# Patient Record
Sex: MALE | Race: ASIAN | NOT HISPANIC OR LATINO | Employment: UNEMPLOYED | URBAN - METROPOLITAN AREA
[De-identification: names, ages, dates, MRNs, and addresses within clinical notes are randomized per-mention and may not be internally consistent; named-entity substitution may affect disease eponyms.]

---

## 2017-02-08 ENCOUNTER — COMMUNICATION - HEALTHEAST (OUTPATIENT)
Dept: FAMILY MEDICINE | Facility: CLINIC | Age: 22
End: 2017-02-08

## 2017-02-08 DIAGNOSIS — K21.9 GERD (GASTROESOPHAGEAL REFLUX DISEASE): ICD-10-CM

## 2018-07-17 ENCOUNTER — AMBULATORY - HEALTHEAST (OUTPATIENT)
Dept: NURSING | Facility: CLINIC | Age: 23
End: 2018-07-17

## 2018-08-08 ENCOUNTER — COMMUNICATION - HEALTHEAST (OUTPATIENT)
Dept: FAMILY MEDICINE | Facility: CLINIC | Age: 23
End: 2018-08-08

## 2018-11-26 DIAGNOSIS — Z00.00 ROUTINE HISTORY AND PHYSICAL EXAMINATION OF ADULT: Primary | ICD-10-CM

## 2018-12-03 ENCOUNTER — OFFICE VISIT (OUTPATIENT)
Dept: FAMILY MEDICINE | Facility: CLINIC | Age: 23
End: 2018-12-03
Payer: MEDICAID

## 2018-12-03 VITALS
SYSTOLIC BLOOD PRESSURE: 124 MMHG | HEART RATE: 68 BPM | TEMPERATURE: 98 F | HEIGHT: 65 IN | BODY MASS INDEX: 22.76 KG/M2 | RESPIRATION RATE: 20 BRPM | WEIGHT: 136.6 LBS | DIASTOLIC BLOOD PRESSURE: 80 MMHG | OXYGEN SATURATION: 96 %

## 2018-12-03 DIAGNOSIS — Z00.00 ROUTINE GENERAL MEDICAL EXAMINATION AT A HEALTH CARE FACILITY: Primary | ICD-10-CM

## 2018-12-03 DIAGNOSIS — K21.9 GASTROESOPHAGEAL REFLUX DISEASE WITHOUT ESOPHAGITIS: ICD-10-CM

## 2018-12-03 RX ORDER — MAGNESIUM HYDROXIDE/ALUMINUM HYDROXICE/SIMETHICONE 120; 1200; 1200 MG/30ML; MG/30ML; MG/30ML
15 SUSPENSION ORAL EVERY 6 HOURS PRN
Qty: 150 ML | Refills: 0 | Status: SHIPPED | OUTPATIENT
Start: 2018-12-03 | End: 2019-12-05

## 2018-12-03 RX ORDER — MAGNESIUM HYDROXIDE/ALUMINUM HYDROXICE/SIMETHICONE 120; 1200; 1200 MG/30ML; MG/30ML; MG/30ML
15 SUSPENSION ORAL
COMMUNITY
Start: 2015-05-07 | End: 2018-12-03

## 2018-12-03 NOTE — MR AVS SNAPSHOT
After Visit Summary   12/3/2018    Briana Rizzo    MRN: [id number]           Patient Information     Date Of Birth          1995        Visit Information        Provider Department      12/3/2018 1:30 PM Richard Hernandez MD [facility]        Today's Diagnoses     Routine general medical examination at a health care facility    -  1    Gastroesophageal reflux disease without esophagitis          Care Instructions    -Please follow-up in 2 weeks when we can get your records.    Preventive Health Recommendations  Male Ages 21 - 25     Yearly exam:             See your health care provider every year in order to  o   Review health changes.   o   Discuss preventive care.    o   Review your medicines if your doctor has prescribed any.    You should be tested each year for STDs (sexually transmitted diseases).     Talk to your provider about cholesterol testing.      If you are at risk for diabetes, you should have a diabetes test (fasting glucose).    Shots: Get a flu shot each year. Get a tetanus shot every 10 years.     Nutrition:    Eat at least 5 servings of fruits and vegetables daily.     Eat whole-grain bread, whole-wheat pasta and brown rice instead of white grains and rice.     Get adequate calcium and Vitamin D.     Lifestyle    Exercise for at least 150 minutes a week (30 minutes a day, 5 days a week). This will help you control your weight and prevent disease.     Limit alcohol to one drink per day.     No smoking.     Wear sunscreen to prevent skin cancer.     See your dentist every six months for an exam and cleaning.             Follow-ups after your visit        Who to contact     Please call your clinic at [phone] to:    Ask questions about your health    Make or cancel appointments    Discuss your medicines    Learn about your test results    Speak to your doctor            Additional Information About Your Visit        Care EveryWhere ID     This is your Care EveryWhere ID.  "This could be used by other organizations to access your [facility] medical records  [id number]        Your Vitals Were     Pulse Temperature Respirations Height Pulse Oximetry BMI (Body Mass Index)    68 98  F (36.7  C) (Oral) 20 5' 4.6\" (164.1 cm) 96% 23.01 kg/m2       Blood Pressure from Last 3 Encounters:   12/03/18 124/80    Weight from Last 3 Encounters:   12/03/18 136 lb 9.6 oz (62 kg)              Today, you had the following     No orders found for display         Today's Medication Changes          These changes are accurate as of 12/3/18  1:57 PM.  If you have any questions, ask your nurse or doctor.               These medicines have changed or have updated prescriptions.        Dose/Directions    alum & mag hydroxide-simethicone 200-200-20 MG/5ML Susp suspension   Commonly known as:  SB ANTACID/ANTIGAS   This may have changed:    - when to take this  - reasons to take this   Used for:  Gastroesophageal reflux disease without esophagitis   Changed by:  Richard Hernandez MD        Dose:  15 mL   Take 15 mLs by mouth every 6 hours as needed for indigestion   Quantity:  150 mL   Refills:  0            Where to get your medicines      These medications were sent to [address] [address]     Phone:  [phone]     alum & mag hydroxide-simethicone 200-200-20 MG/5ML Susp suspension                Primary Care Provider Office Phone # Fax #    Sanaz Rose [phone]       [address] [place] [address]        Equal Access to Services     LONNIE WHITFIELD : Hadii caroline grider hadasho Soomaali, waaxda luqadaha, qaybta kaalmada monalisayaguerline gonzalez idirenata nuñez. So [facility] [phone].    ATENCIÓN: Si habla español, tiene a velázquez disposición servicios gratuitos de asistencia lingüística. Llame al [phone].    We comply with applicable federal civil rights laws and [place] laws. We do not discriminate on " the basis of race, color, national origin, age, disability, sex, sexual orientation, or gender identity.            Thank you!     Thank you for choosing [facility]  for your care. Our goal is always to provide you with excellent care. Hearing back from our patients is one way we can continue to improve our services. Please take a few minutes to complete the written survey that you may receive in the mail after your visit with us. Thank you!             Your Updated Medication List - Protect others around you: Learn how to safely use, store and throw away your medicines at www.disposemymeds.org.          This list is accurate as of 12/3/18  1:57 PM.  Always use your most recent med list.                   Brand Name Dispense Instructions for use Diagnosis    alum & mag hydroxide-simethicone 200-200-20 MG/5ML Susp suspension    SB ANTACID/ANTIGAS    150 mL    Take 15 mLs by mouth every 6 hours as needed for indigestion    Gastroesophageal reflux disease without esophagitis

## 2018-12-03 NOTE — PROGRESS NOTES
Preceptor Attestation:   Patient seen, evaluated and discussed with the resident. I have verified the content of the note, which accurately reflects my assessment of the patient and the plan of care.   Supervising Physician:  Ronak Mena MD

## 2018-12-03 NOTE — NURSING NOTE
Due to patient being non-English speaking/uses sign language, an  was used for this visit. Only for face-to-face interpretation by an external agency, date and length of interpretation can be found on the scanned worksheet.     name: Luca Segovia  Agency: [employer]  Language: [place]   Telephone number: [phone]  Type of interpretation: Face-to-face, spoken

## 2018-12-03 NOTE — PATIENT INSTRUCTIONS
-Please follow-up in 2 weeks when we can get your records.    Preventive Health Recommendations  Male Ages 21 - 25     Yearly exam:             See your health care provider every year in order to  o   Review health changes.   o   Discuss preventive care.    o   Review your medicines if your doctor has prescribed any.    You should be tested each year for STDs (sexually transmitted diseases).     Talk to your provider about cholesterol testing.      If you are at risk for diabetes, you should have a diabetes test (fasting glucose).    Shots: Get a flu shot each year. Get a tetanus shot every 10 years.     Nutrition:    Eat at least 5 servings of fruits and vegetables daily.     Eat whole-grain bread, whole-wheat pasta and brown rice instead of white grains and rice.     Get adequate calcium and Vitamin D.     Lifestyle    Exercise for at least 150 minutes a week (30 minutes a day, 5 days a week). This will help you control your weight and prevent disease.     Limit alcohol to one drink per day.     No smoking.     Wear sunscreen to prevent skin cancer.     See your dentist every six months for an exam and cleaning.

## 2018-12-03 NOTE — PROGRESS NOTES
Male Physical Note      Concerns today: Needs refills for medications. Has emesis 2-3 times per week for the last two years.     A [employer]  was used for  this visit.     ROS:                      CONSTITUTIONAL: no fatigue, no unexpected change in weight  SKIN: no worrisome rashes, no worrisome moles, no worrisome lesions  EYES: no acute vision problems or changes  ENT: no ear problems, no mouth problems, no throat problems  RESP: no significant cough, no shortness of breath  CV: no chest pain, no palpitations, no new or worsening peripheral edema  GI: no nausea, no vomiting, no constipation, no diarrhea    No past medical history on file.     No family history on file.  Reviewed no other significant FH           Family History and past Medical History reviewed and unchanged/updated.    Social History   Substance Use Topics     Smoking status: Not on file     Smokeless tobacco: Not on file     Alcohol use Not on file     Single  Children ? no    Has anyone hurt you physically, for example by pushing, hitting, slapping or kicking you or forcing you to have sex? Denies  Do you feel threatened or controlled by a partner, ex-partner or anyone in your life? Denies    RISK BEHAVIORS AND HEALTHY HABITS:  Tobacco Use/Smoking: None  Illicit Drug Use: None  ETOH: None  Do you use alcohol? No  Sexually Active: No  Diet (5-7 servings of fruits/veg daily): Yes   Exercise (30 min accumulated most days):No  Dental Care: Yes   Calcium 1500 mg/d:  No  Seat Belt Use: Yes     HIV screening:  Testing not indicated       Immunization History   Administered Date(s) Administered     Hep B, Peds or Adolescent 12/06/2006     HepA, Unspecified 05/15/2008, 12/15/2008     Influenza Vaccine, 3 YRS +, IM (QUADRIVALENT W/PRESERVATIVES) 10/20/2014     MMR 12/06/2006, 02/26/2008     Meningococcal (Menactra ) 05/15/2008     Meningococcal (Menomune ) 02/26/2008     Polio, Unspecified  02/26/2008, 05/15/2008, 12/15/2008     Td (Adult),  "Adsorbed 12/06/2006, 12/15/2008     Tdap (Adult) Unspecified Formulation 02/26/2008     Varicella 12/06/2006, 02/26/2008     Reviewed Immunization Record Today    EXAMINATION:  /80  Pulse 68  Temp 98  F (36.7  C) (Oral)  Resp 20  Ht 5' 4.6\" (164.1 cm)  Wt 136 lb 9.6 oz (62 kg)  SpO2 96%  BMI 23.01 kg/m2  GENERAL: healthy, alert and no distress  EYES: Eyes grossly normal to inspection, extraocular movements - intact, and PERRL  HENT: ear canals- normal; TMs- clear effusion on left; Nose- normal; Mouth- no ulcers, no lesions  NECK: no tenderness, no adenopathy, no asymmetry, no masses, no stiffness; thyroid- normal to palpation  RESP: lungs clear to auscultation - no rales, no rhonchi, no wheezes  CV: regular rates and rhythm, normal S1 S2, no S3 or S4 and no murmur, no click or rub -  ABDOMEN: soft, no tenderness, no  hepatosplenomegaly, no masses, normal bowel sounds  MS: extremities- no gross deformities noted, no edema  SKIN: no suspicious lesions, no rashes  NEURO: strength and tone- normal, sensory exam- grossly normal, mentation- intact, speech- normal, reflexes- symmetric  BACK: no CVA tenderness, no paralumbar tenderness  - male: Patient declined  exam  PSYCH: Does not articulate thoughts, does not speak in sentences, Per  patient is speaking nonsensically at times.   LYMPHATICS: ant. cervical- normal, post. cervical- normal    ASSESSMENT:  1. Health Care Maintenance: Normal Physical Exam  2. Per patient's mother, patient has some type of learning disability.   3. Reflux    PLAN:  1. [facility]  2. Refill patient's Maalox  3. Follow-up in 2 weeks.     "

## 2018-12-17 ENCOUNTER — OFFICE VISIT (OUTPATIENT)
Dept: FAMILY MEDICINE | Facility: CLINIC | Age: 23
End: 2018-12-17
Payer: MEDICAID

## 2018-12-17 VITALS
OXYGEN SATURATION: 98 % | RESPIRATION RATE: 20 BRPM | SYSTOLIC BLOOD PRESSURE: 146 MMHG | HEART RATE: 97 BPM | DIASTOLIC BLOOD PRESSURE: 80 MMHG | TEMPERATURE: 98.5 F | WEIGHT: 136 LBS | BODY MASS INDEX: 22.91 KG/M2

## 2018-12-17 DIAGNOSIS — R04.0 EPISTAXIS: Primary | ICD-10-CM

## 2018-12-17 NOTE — NURSING NOTE
Due to patient being non-English speaking/uses sign language, an  was used for this visit. Only for face-to-face interpretation by an external agency, date and length of interpretation can be found on the scanned worksheet.     name: MARIAM BECERRA  Agency: [employer]  Language: [place]   Telephone number: [phone]  Type of interpretation: Face-to-face, spoken

## 2018-12-17 NOTE — PROGRESS NOTES
ASSESSMENT AND PLAN      Briana was seen today for recheck.    Diagnoses and all orders for this visit:    Epistaxis: Likely secondary to dry weather. No other signs of coagulation problems. Recommended that patient use Vaseline on the inside of the nares. Follow-up in the spring  -Requested records again from [facility] as well as [facility]. Will have social work follow-up on patient's care management status.    Richard Hernandez MD PGY3  [facility]                SUBJECTIVE       Briana Rizzo is a 23 year old  male with a PMH significant for:   There is no problem list on file for this patient.    He presents to establish care. Patient's mother also has concern that patient has nose bleeds 2-3 times per week that last ~10 mins at a time. He does not have any bruising or bleeding from his gums. They do not hold pressure with nose bleeds, but put a cold pack on his forehead. Bleeding does stop spontaneously.    Per mother, patient has had difficulty with speech since he was a child. Per mother, he had malaria soon after he was born. Moved to [place] in 2007 from refugee [place]. He can eat and dress himself. He needs some help with taking a shower. He is unable to cook for himself. Mother is his PCA. He is getting ssi benefits at this time.     He sleeps at 11 pm, wakes up at 7-8am typically. 2-3 times per week he is up througout the evening and doesn't sleeping.     Per mother, he has a care manager, but she does not know who it is. He did attend [facility] and had 2-3 years of classes afterwards. He does not currently have any speech therapy.     PMH, Medications and Allergies were reviewed and updated as needed.          OBJECTIVE     Vitals:    12/17/18 0926   BP: 146/80   Pulse: 97   Resp: 20   Temp: 98.5  F (36.9  C)   TempSrc: Oral   SpO2: 98%   Weight: 61.7 kg (136 lb)     Body mass index is 22.91 kg/m .    General: Well appearing, smiles at me when asked questions, will follow commands, does  not answer questions.  HEENT: Septum in bilateral nares are erythematous, no blood in the nares. Pharynx clear, mucous membranes moist.   CV: S1, S2, RRR. No murmurs, clicks, or rubs  RESP: Clear to auscultation bilaterally.     No results found for this or any previous visit (from the past 24 hour(s)).

## 2018-12-18 NOTE — PROGRESS NOTES
Preceptor Attestation:   Patient seen, evaluated and discussed with the resident. I have verified the content of the note, which accurately reflects my assessment of the patient and the plan of care.   Supervising Physician:  Angy Coello MD

## 2018-12-19 ENCOUNTER — TELEPHONE (OUTPATIENT)
Dept: FAMILY MEDICINE | Facility: CLINIC | Age: 23
End: 2018-12-19

## 2018-12-19 NOTE — TELEPHONE ENCOUNTER
BALAJI recieved a message from  requesting care coordination help in finding who the patient's  is. Per visit note from 12/17/18, mother reports that Briana has a  but she is not sure who that person is. Gloria is requesting records from [facility] and [facility]. SW will review patient's chart in 2 weeks to determine if those records have been requested and review for any relevant information that may lead to finding out who the current  is.     If records have not been received in 2 weeks, SW will reach out to mother to inquire further on any information she might have leading to /agency.     BUCK Anand

## 2019-01-02 NOTE — TELEPHONE ENCOUNTER
Records have not been recieved from either of the agencies listed below.     SW will check again in 1 week. If no records at that time, SW will attempt outreach to request the records again.     BUCK Anand

## 2019-01-11 PROBLEM — F09 COGNITIVE DISORDER: Status: ACTIVE | Noted: 2019-01-11

## 2019-11-26 DIAGNOSIS — K21.9 GASTROESOPHAGEAL REFLUX DISEASE WITHOUT ESOPHAGITIS: ICD-10-CM

## 2019-12-05 ENCOUNTER — OFFICE VISIT (OUTPATIENT)
Dept: FAMILY MEDICINE | Facility: CLINIC | Age: 24
End: 2019-12-05
Payer: MEDICAID

## 2019-12-05 VITALS
TEMPERATURE: 97.9 F | RESPIRATION RATE: 18 BRPM | HEIGHT: 65 IN | BODY MASS INDEX: 22.86 KG/M2 | HEART RATE: 87 BPM | WEIGHT: 137.2 LBS | SYSTOLIC BLOOD PRESSURE: 120 MMHG | DIASTOLIC BLOOD PRESSURE: 79 MMHG

## 2019-12-05 DIAGNOSIS — R04.0 EPISTAXIS: ICD-10-CM

## 2019-12-05 DIAGNOSIS — Z23 NEED FOR PROPHYLACTIC VACCINATION AND INOCULATION AGAINST INFLUENZA: ICD-10-CM

## 2019-12-05 DIAGNOSIS — R10.84 ABDOMINAL PAIN, GENERALIZED: Primary | ICD-10-CM

## 2019-12-05 DIAGNOSIS — A04.8 H. PYLORI INFECTION: ICD-10-CM

## 2019-12-05 DIAGNOSIS — M79.18 MUSCULOSKELETAL PAIN: ICD-10-CM

## 2019-12-05 DIAGNOSIS — K21.9 GASTROESOPHAGEAL REFLUX DISEASE WITHOUT ESOPHAGITIS: ICD-10-CM

## 2019-12-05 LAB
ALBUMIN SERPL-MCNC: 5 MG/DL (ref 3.9–5.1)
ALP SERPL-CCNC: 69.6 U/L (ref 40–150)
ALT SERPL-CCNC: 27 U/L (ref 0–45)
AST SERPL-CCNC: 25.5 U/L (ref 0–55)
BILIRUB SERPL-MCNC: 1.6 MG/DL (ref 0.2–1.3)
BUN SERPL-MCNC: 11.3 MG/DL (ref 7–21)
CALCIUM SERPL-MCNC: 10.2 MG/DL (ref 8.5–10.1)
CHLORIDE SERPLBLD-SCNC: 99.4 MMOL/L (ref 98–110)
CO2 SERPL-SCNC: 26.3 MMOL/L (ref 20–32)
CREAT SERPL-MCNC: 1 MG/DL (ref 0.7–1.3)
GFR SERPL CREATININE-BSD FRML MDRD: >90 ML/MIN/1.7 M2
GLUCOSE SERPL-MCNC: 100.2 MG'DL (ref 70–99)
POTASSIUM SERPL-SCNC: 4 MMOL/DL (ref 3.2–4.6)
PROT SERPL-MCNC: 8.4 G/DL (ref 6.8–8.8)
SODIUM SERPL-SCNC: 136 MMOL/L (ref 132–142)

## 2019-12-05 RX ORDER — BACITRACIN ZINC 500 [USP'U]/G
OINTMENT TOPICAL 2 TIMES DAILY
Qty: 30 G | Refills: 3 | Status: SHIPPED | OUTPATIENT
Start: 2019-12-05 | End: 2021-06-22

## 2019-12-05 RX ORDER — MAGNESIUM HYDROXIDE/ALUMINUM HYDROXICE/SIMETHICONE 120; 1200; 1200 MG/30ML; MG/30ML; MG/30ML
15 SUSPENSION ORAL EVERY 6 HOURS PRN
Qty: 150 ML | Refills: 0 | Status: SHIPPED | OUTPATIENT
Start: 2019-12-05 | End: 2024-05-28

## 2019-12-05 RX ORDER — ACETAMINOPHEN 500 MG
500-1000 TABLET ORAL EVERY 6 HOURS PRN
Qty: 1 BOTTLE | Refills: 3 | Status: SHIPPED | OUTPATIENT
Start: 2019-12-05 | End: 2022-01-24

## 2019-12-05 ASSESSMENT — MIFFLIN-ST. JEOR: SCORE: 1534.22

## 2019-12-05 NOTE — PROGRESS NOTES
"       SUBJECTIVE       Briana Rizzo is a 24 year old  male with a PMH significant for:     Patient Active Problem List   Diagnosis     Cognitive disorder     Patient presents today with multiple complaints of nose bleeds, headache, abdominal pain, hip pain, calf pain, and heel pain. 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  He was a refugee who immigrated to [place] in 2007.  He has severely limited speech resulting in limited ability to obtain history.     He has had long-standing history of nosebleeds which he lately has been getting twice a day which last 5 to 10 minutes and resolve spontaneously per pt's dad.  Patient does pick his nose.  Per chart review patient had similar complaint in 2018 and was instructed to use Vaseline ointment in his nares.  Unclear whether this was helpful or not.      Patient's abdominal pain has been going on for the past 2 months.  Per patient's father is getting worse and he has begun to throw up.  Patient answers yes when asked if he ever has blood in his vomit or stool.  Her patient's father emesis is composed of yellow stomach contents.  Patient unable to expound on symptoms, but per father he has been asking for medication at home.    The patient's hip pain, calf pain, and heel pain have similarly been present for about the past 2 months.  Patient is unable to qualify his pain further but similarly asks for medication and reports pain at home.    Patient has had subjective fever per father \"1-2 times per week\".  He has had no diarrhea or constipation, no sick contacts, and no change in his appetite.  However due to stomach pain he often eats in small quantities.  Patient has no chest pain or difficulty with breathing and no change in weight.      PMH, Medications and Allergies were reviewed and updated as needed.        REVIEW OF SYSTEMS     12 point review of systems negative except per HPI        OBJECTIVE     Vitals:    " "12/05/19 1108   BP: 120/79   Pulse: 87   Resp: 18   Temp: 97.9  F (36.6  C)   Weight: 62.2 kg (137 lb 3.2 oz)   Height: 1.651 m (5' 5\")     Body mass index is 22.83 kg/m .    Constitutional: Awake, alert, cooperative, no apparent distress, and appears stated age.  Eyes: Lids and lashes normal, pupils equal, round and reactive to light  ENT: Normocephalic, without obvious abnormality, atramatic, external ears without lesions, oral pharynx with moist mucus membranes, tonsils without erythema or exudates, gums normal and good dentition.  Nares with no clots or lacerations  Lungs: No increased work of breathing, good air exchange, clear to auscultation bilaterally, no crackles or wheezing.  Cardiovascular: Regular rate and rhythm, normal S1 and S2, no S3 or S4, and no murmur noted.  Abdomen: No scars, normal bowel sounds, soft, non-distended, tenderness to palpation with no rebound or guarding  Musculoskeletal: No redness, warmth, or swelling of the joints.  Full range of motion noted.  Motor strength is 5 out of 5 all extremities bilaterally.  Tone is normal.  Neurologic: Awake, alert, oriented to name, place and time.  Cranial nerves II-XII are grossly intact.  Motor is 5 out of 5 bilaterally.    Neuropsychiatric: Normal affect, mood, difficult to assess due to cognitive limitations  Skin: No rashes, erythema, pallor, petechia or purpura.    No results found for this or any previous visit (from the past 24 hour(s)).        ASSESSMENT AND PLAN     1. Abdominal pain, generalized  Unclear etiology, however patient requesting \"liquid medication\" prescribed in the past.  Per chart review was prescribed aluminum mag hydroxide simethicone suspension, will refill.  Due to reports of possible blood in vomit and stool will investigate with basic labs.  Considering high rate of H. pylori in [place] refugee population will test for fecal antigen as well  - alum & mag hydroxide-simethicone (SB ANTACID/ANTIGAS) 200-200-20 MG/5ML SUSP " suspension; Take 15 mLs by mouth every 6 hours as needed for indigestion  Dispense: 150 mL; Refill: 0  - Comprehensive Metabolic Panel ([facility])  - H. Pylori Agn Fecal ([facility]); Future    2. Musculoskeletal pain  Unclear symptomatology and etiology, difficult to assess due to limited ability to report history.  Treat symptoms with Tylenol and refer to physical therapy for evaluation and treatment of patient's ambulation, strength and flexibility.  - PHYSICAL THERAPY REFERRAL; Future  - acetaminophen (TYLENOL) 500 MG tablet; Take 1-2 tablets (500-1,000 mg) by mouth every 6 hours as needed for mild pain  Dispense: 1 Bottle; Refill: 3    3. Epistaxis  Given history patient's nosebleeds are likely secondary to dry air and digital trauma.  Will treat with bacitracin ointment due to risk of infection from ongoing digital trauma.  - bacitracin 500 UNIT/GM external ointment; Apply topically 2 times daily Apply to nose twice daily  Dispense: 30 g; Refill: 3    Addendum:  Pt tested positive for H. Pylori via fecal antigen testing. With no history of penicillin allergy or resistant organism will proceed with treatment of H. Pylori with Clarithromycin based triple therapy. (Omeprazole 20mg twice daily, Clarithromycin 500mg twice daily, and Amoxicillin 1g twice daily) for 14 days. Prescriptions sent to pharmacy, and result routed with instructions to call pt. Pt may set up appointment to discuss these results and treatment if desires, otherwise please set up appointment in 2 weeks to evaluate success of treatment.     RTC in 2 weeks for follow up of abdominal pain and epistaxis or sooner if develops new or worsening symptoms.    Jus To MD

## 2019-12-05 NOTE — PROGRESS NOTES
Preceptor Attestation:   Patient seen, evaluated and discussed with the resident. I have verified the content of the note, which accurately reflects my assessment of the patient and the plan of care.   Supervising Physician:  Ilan Strong MD.

## 2019-12-05 NOTE — NURSING NOTE
Due to patient being non-English speaking/uses sign language, an  was used for this visit. Only for face-to-face interpretation by an external agency, date and length of interpretation can be found on the scanned worksheet.     name: Luca Segovia  Agency: [employer]  Language: [place]   Telephone number: [phone]  Type of interpretation: Face-to-face, spoken

## 2019-12-06 ENCOUNTER — AMBULATORY - HEALTHEAST (OUTPATIENT)
Dept: OCCUPATIONAL THERAPY | Facility: CLINIC | Age: 24
End: 2019-12-06

## 2019-12-06 DIAGNOSIS — M79.18 MUSCULOSKELETAL PAIN: ICD-10-CM

## 2019-12-06 DIAGNOSIS — R10.84 ABDOMINAL PAIN, GENERALIZED: ICD-10-CM

## 2019-12-09 LAB — H PYLORI ANTIGEN: POSITIVE

## 2019-12-10 RX ORDER — OMEPRAZOLE 20 MG/1
20 TABLET, DELAYED RELEASE ORAL 2 TIMES DAILY
Qty: 28 TABLET | Refills: 0 | Status: SHIPPED | OUTPATIENT
Start: 2019-12-10 | End: 2019-12-24

## 2019-12-10 RX ORDER — CLARITHROMYCIN 500 MG
500 TABLET ORAL 2 TIMES DAILY
Qty: 28 TABLET | Refills: 0 | Status: SHIPPED | OUTPATIENT
Start: 2019-12-10 | End: 2019-12-24

## 2019-12-10 RX ORDER — AMOXICILLIN 500 MG/1
1000 CAPSULE ORAL 2 TIMES DAILY
Qty: 56 CAPSULE | Refills: 0 | Status: SHIPPED | OUTPATIENT
Start: 2019-12-10 | End: 2019-12-24

## 2020-02-24 ENCOUNTER — OFFICE VISIT (OUTPATIENT)
Dept: FAMILY MEDICINE | Facility: CLINIC | Age: 25
End: 2020-02-24
Payer: MEDICAID

## 2020-02-24 VITALS
TEMPERATURE: 98.2 F | BODY MASS INDEX: 22.82 KG/M2 | OXYGEN SATURATION: 98 % | HEIGHT: 65 IN | RESPIRATION RATE: 16 BRPM | HEART RATE: 72 BPM | SYSTOLIC BLOOD PRESSURE: 126 MMHG | DIASTOLIC BLOOD PRESSURE: 80 MMHG | WEIGHT: 137 LBS

## 2020-02-24 DIAGNOSIS — F09 COGNITIVE DISORDER: Primary | ICD-10-CM

## 2020-02-24 ASSESSMENT — MIFFLIN-ST. JEOR: SCORE: 1521.43

## 2020-02-24 NOTE — LETTER
2/24/2020    Re: Briana Rizzo  1995    To Whom It May Concern:    Briana Rizzo is my patient at [facility] in [facility]. I understand he recently had his PCA hours cut from 5 1/2 hours daily to 1/2 hour daily. This was apparently due to a miscommunication from his father in the setting of an acute illness. It is my medical opinion that Briana Rizzo requires medical assistance beyond that of 1/2 hour daily due to his diagnosis of Cognitive delay. Briana is largely non-verbal and requires assistance with ADLs and medication administration.     Please reassess PCA hours as soon as possible.     Do not hesitate to call with any questions or concerns.    Jus To MD  2/24/2020 11:17 AM

## 2020-02-24 NOTE — PROGRESS NOTES
"       SUBJECTIVE       Briana Rizzo is a 25 year old  male with a PMH significant for:     Patient Active Problem List   Diagnosis     Cognitive disorder     He presents for help with PCA hours. Previously Briana had PCA assistance for 5hrs and 30 min daily, but recently when he was interviewed for renewal of his services he had a miscommunication because he was sick. He says when he was asked questions about Briana's requirements he answered \"no\" to everything without listening closely because he was vomiting and acutely ill. He says that this interview resulted in a significant reduction in PCA assistance for Briana. He is here today requesting a re-evaluation of Briana's PCA hours.     Briana requires assistance for cognitive delay. He is largely non-verbal and needs help with ADLs and medication administration.     PMH, Medications and Allergies were reviewed and updated as needed.        REVIEW OF SYSTEMS     12 point review of systems negative except as noted in HPI.        OBJECTIVE     Vitals:    02/24/20 1046   BP: 126/80   BP Location: Left arm   Patient Position: Sitting   Pulse: 72   Resp: 16   Temp: 98.2  F (36.8  C)   TempSrc: Oral   SpO2: 98%   Weight: 62.1 kg (137 lb)   Height: 1.64 m (5' 4.57\")     Body mass index is 23.1 kg/m .    Constitutional: Awake, alert, cooperative, no apparent distress, and appears stated age.  Eyes: Lids and lashes normal, pupils equal, round and reactive to light, extra ocular muscles intact, sclera clear, conjunctiva normal.  ENT: Normocephalic, without obvious abnormality, atraumatic, oral pharynx with moist mucus membranes  Lungs: No increased work of breathing, good air exchange, clear to auscultation bilaterally, no crackles or wheezing.  Cardiovascular: Regular rate and rhythm, normal S1 and S2, no S3 or S4, and no murmur noted.  Neurologic: Awake, alert, Cranial nerves II-XII are grossly intact.    Neuropsychiatric: Baseline affect/mood.   Skin: No rashes, erythema, pallor, " petechia or purpura.    No results found for this or any previous visit (from the past 24 hour(s)).        ASSESSMENT AND PLAN     Briana was seen today for other.    Diagnoses and all orders for this visit:    Cognitive disorder      Briana's father recently completed an interview for assessment of PCA hours which resulted in a significant cutback of his hours. He is here requesting a physician letter for reassessment of his hours. A letter was written and provided to the pt stating it is my medical opinion he requires more services and that a reassessment should be done asap.       RTC as needed if develops new or worsening symptoms.    Jus To MD  I precepted with Dr. Roby Rosario

## 2020-02-24 NOTE — NURSING NOTE
Due to patient being non-English speaking/uses sign language, an  was used for this visit. Only for face-to-face interpretation by an external agency, date and length of interpretation can be found on the scanned worksheet.     name: Luca Segovia  Agency: [employer]  Language: Claudia   Telephone number: [phone]  Type of interpretation: Face-to-face, spoken

## 2020-02-24 NOTE — PROGRESS NOTES
Preceptor Attestation:   Patient seen, evaluated and discussed with the resident. I have verified the content of the note, which accurately reflects my assessment of the patient and the plan of care.   Supervising Physician:  Roby Frazier MD

## 2020-02-24 NOTE — LETTER
2/24/2020    Re: Briana Rizzo  1995      To Whom It May Concern:    Briana Rizzo is my patient at [facility]. I understand he recently had his PCA hours cut from 5 1/2 hours daily to 1/2 hour daily. This was apparently due to a miscommunication from his father in the setting of an acute illness. It is my medical opinion that Briana Rizzo requires medical assistance beyond that of 1/2 hour daily due to his diagnosis of Cognitive delay. Briana is largely non-verbal and requires assistance with ADLs and medication administration.     Please do not hesitate to call with any questions or concerns.    Jus To MD  2/24/2020 11:05 AM

## 2020-08-31 DIAGNOSIS — K21.9 GASTROESOPHAGEAL REFLUX DISEASE WITHOUT ESOPHAGITIS: ICD-10-CM

## 2020-09-03 RX ORDER — ALUMINUM HYDROXIDE, MAGNESIUM HYDROXIDE, SIMETHICONE 400; 400; 40 MG/5ML; MG/5ML; MG/5ML
SUSPENSION ORAL
Qty: 355 ML | Refills: 0 | Status: SHIPPED | OUTPATIENT
Start: 2020-09-03 | End: 2020-09-19

## 2020-09-14 DIAGNOSIS — K21.9 GASTROESOPHAGEAL REFLUX DISEASE WITHOUT ESOPHAGITIS: ICD-10-CM

## 2020-09-19 RX ORDER — ALUMINUM HYDROXIDE, MAGNESIUM HYDROXIDE, SIMETHICONE 400; 400; 40 MG/5ML; MG/5ML; MG/5ML
SUSPENSION ORAL
Qty: 355 ML | Refills: 0 | Status: SHIPPED | OUTPATIENT
Start: 2020-09-19 | End: 2021-06-22

## 2021-05-14 NOTE — TELEPHONE ENCOUNTER
Patient scheduled for physical and med refill.      Emmanuel Newberry, MATTHEW  1:44 PM  5/14/2021

## 2021-05-28 ENCOUNTER — OFFICE VISIT (OUTPATIENT)
Dept: FAMILY MEDICINE | Facility: CLINIC | Age: 26
End: 2021-05-28
Payer: MEDICAID

## 2021-05-28 ENCOUNTER — RECORDS - HEALTHEAST (OUTPATIENT)
Dept: ADMINISTRATIVE | Facility: OTHER | Age: 26
End: 2021-05-28

## 2021-05-28 ENCOUNTER — RECORDS - HEALTHEAST (OUTPATIENT)
Dept: ADMINISTRATIVE | Facility: REHABILITATION | Age: 26
End: 2021-05-28

## 2021-05-28 VITALS
HEART RATE: 66 BPM | DIASTOLIC BLOOD PRESSURE: 86 MMHG | BODY MASS INDEX: 25.43 KG/M2 | SYSTOLIC BLOOD PRESSURE: 128 MMHG | HEIGHT: 65 IN | RESPIRATION RATE: 16 BRPM | WEIGHT: 152.6 LBS

## 2021-05-28 DIAGNOSIS — A04.8 H. PYLORI INFECTION: ICD-10-CM

## 2021-05-28 DIAGNOSIS — R52 WHOLE BODY PAIN: ICD-10-CM

## 2021-05-28 DIAGNOSIS — M25.50 ARTHRALGIA, UNSPECIFIED JOINT: ICD-10-CM

## 2021-05-28 DIAGNOSIS — Z00.00 ROUTINE GENERAL MEDICAL EXAMINATION AT A HEALTH CARE FACILITY: Primary | ICD-10-CM

## 2021-05-28 PROCEDURE — 99395 PREV VISIT EST AGE 18-39: CPT | Mod: GC | Performed by: STUDENT IN AN ORGANIZED HEALTH CARE EDUCATION/TRAINING PROGRAM

## 2021-05-28 ASSESSMENT — MIFFLIN-ST. JEOR: SCORE: 1594.07

## 2021-05-28 NOTE — NURSING NOTE
Due to patient being non-English speaking/uses sign language, an  was used for this visit. Only for face-to-face interpretation by an external agency, date and length of interpretation can be found on the scanned worksheet.     name: Breann Del Cid  Agency: [employer]  Language: Claudia   Telephone number: [phone]  Type of interpretation: Face-to-face, spoken

## 2021-05-28 NOTE — PROGRESS NOTES
Male Physical Note      Concerns today:     H Pylori: pointing to stomach     Body pain/body aches: arm leg and back pain which is helped with massage. Started one year ago, using tylenol treat.     Bloody nose: 1-2 times per week lasting for 4-5 minutes. They are stopped with tissue paper. Always has had.     A [employer]  was used for  this visit.     ROS:                      CONSTITUTIONAL: no fatigue, no unexpected change in weight  SKIN: no worrisome rashes, no worrisome moles, no worrisome lesions  EYES: no acute vision problems or changes  ENT: no ear problems, no mouth problems, no throat problems  RESP: no significant cough, no shortness of breath  CV: no chest pain, no palpitations, no new or worsening peripheral edema  GI: no nausea, no vomiting, no constipation, no diarrhea    No past medical history on file.     Family History   Problem Relation Age of Onset     Hypertension Father      Diabetes No family hx of      Cancer No family hx of      Heart Disease No family hx of      Reviewed no other significant FH           Family History and past Medical History reviewed and unchanged/updated.    Social History     Tobacco Use     Smoking status: Never Smoker     Smokeless tobacco: Never Used   Substance Use Topics     Alcohol use: Not on file     Single  Children ? no    Has anyone hurt you physically, for example by pushing, hitting, slapping or kicking you or forcing you to have sex? Denies  Do you feel threatened or controlled by a partner, ex-partner or anyone in your life? Denies    RISK BEHAVIORS AND HEALTHY HABITS:  Tobacco Use/Smoking: None  Illicit Drug Use: None  ETOH: None  Do you use alcohol? No  Sexually Active: No  Diet (5-7 servings of fruits/veg daily): Yes   Exercise (30 min accumulated most days):No  Dental Care: No, advised to set up appointment   Calcium 1500 mg/d:  No  Seat Belt Use: Yes     Immunization History   Administered Date(s) Administered     Hep B, Peds or  "Adolescent 12/06/2006     HepA, Unspecified 05/15/2008, 12/15/2008     Influenza Vaccine, 6+MO IM (QUADRIVALENT W/PRESERVATIVES) 10/20/2014, 12/05/2019     MMR 12/06/2006, 02/26/2008     Meningococcal (Menactra ) 05/15/2008     Meningococcal (Menomune ) 02/26/2008     Polio, Unspecified  02/26/2008, 05/15/2008, 12/15/2008     Td (Adult), Adsorbed 12/06/2006, 12/15/2008     Tdap (Adult) Unspecified Formulation 02/26/2008     Varicella 12/06/2006, 02/26/2008     Reviewed Immunization Record Today    EXAMINATION:  /86   Pulse 66   Resp 16   Ht 1.651 m (5' 5\")   Wt 69.2 kg (152 lb 9.6 oz)   BMI 25.39 kg/m    GENERAL: healthy, alert and no distress  EYES: Eyes grossly normal to inspection, extraocular movements - intact, and PERRL  HENT: ear canals- normal; TMs- normal; Nose- normal; Mouth- no ulcers, no lesions  NECK: no tenderness, no adenopathy, no asymmetry, no masses, no stiffness; thyroid- normal to palpation  RESP: lungs clear to auscultation - no rales, no rhonchi, no wheezes  BREAST: no masses, no tenderness, no nipple discharge, no palpable axillary masses or adenopathy  CV: regular rates and rhythm, normal S1 S2, no S3 or S4 and no murmur, no click or rub -  ABDOMEN: soft, no tenderness, no  hepatosplenomegaly, no masses, normal bowel sounds  MS: extremities- no gross deformities noted, no edema  SKIN: no suspicious lesions, no rashes  NEURO: strength and tone- normal, sensory exam- grossly normal, mentation- intact, speech- normal, reflexes- symmetric  BACK: no CVA tenderness, no paralumbar tenderness  PSYCH: Alert and oriented times 3; speech- coherent , normal rate and volume; able to articulate logical thoughts, able to abstract reason, no tangential thoughts, no hallucinations or delusions, affect- normal  LYMPHATICS: ant. cervical- normal, post. cervical- normal, axillary- normal, supraclavicular- normal, inguinal- normal    ASSESSMENT:  1. Health Care Maintenance: Normal Physical Exam, need " for vaccination  1. History of H. Pylori  2. Diffuse body pains   3. Epistaxis      PLAN:  1.Pt needs to be scheduled for TDAP and Hep B vaccination  2. Retest H pylori stool antigen for confirmation of cure  3. Treat pain with tylenol, conservative measures, and physical therapy  4. Vasaline intranasal each night, return if fails to improve      Precepted with Dr Trey To MD PGY2  [facility]

## 2021-05-28 NOTE — PROGRESS NOTES
Preceptor Attestation:    I discussed the patient with the resident and evaluated the patient in person. I have verified the content of the note, which accurately reflects my assessment of the patient and the plan of care.   Supervising Physician:  Trey Alejo MD.

## 2021-06-01 ENCOUNTER — RECORDS - HEALTHEAST (OUTPATIENT)
Dept: ADMINISTRATIVE | Facility: REHABILITATION | Age: 26
End: 2021-06-01

## 2021-06-01 ENCOUNTER — RECORDS - HEALTHEAST (OUTPATIENT)
Dept: ADMINISTRATIVE | Facility: OTHER | Age: 26
End: 2021-06-01

## 2021-06-01 DIAGNOSIS — A04.8 H. PYLORI INFECTION: ICD-10-CM

## 2021-06-01 DIAGNOSIS — M25.50 ARTHRALGIA, UNSPECIFIED JOINT: ICD-10-CM

## 2021-06-02 LAB — H PYLORI AG STL QL IA: NEGATIVE

## 2021-06-02 NOTE — RESULT ENCOUNTER NOTE
Hello November,    Please call the following patient with the results below. I am proxy for Dr. To. Thank you!    Irving Rizzo,    I hope you're well. I wanted to communicate with you the results of the tests that we did.     The laboratory results show that you are negative for H. Pylori bacteria. Please follow up with Dr. To for an in person or virtual visit if you have further questions.    Thank you!  Alyssa Funez MD PGY2

## 2021-06-03 ENCOUNTER — TELEPHONE (OUTPATIENT)
Dept: FAMILY MEDICINE | Facility: CLINIC | Age: 26
End: 2021-06-03

## 2021-06-03 NOTE — TELEPHONE ENCOUNTER
Called patient and family to discuss setting up PT appointment. No answer, will try again at later time. ./LR

## 2021-06-07 NOTE — TELEPHONE ENCOUNTER
Spoke with patient's mother with [place]ia language line . Discussed what physical therapy is and that it will likely entail multiple appointments that I can assist in coordinating. They would like to think about this, and will call me if they decide they are interested. They feel comfortable calling the clinic on their own. ./LR

## 2021-06-15 DIAGNOSIS — R04.0 EPISTAXIS: ICD-10-CM

## 2021-06-15 DIAGNOSIS — K21.9 GASTROESOPHAGEAL REFLUX DISEASE WITHOUT ESOPHAGITIS: ICD-10-CM

## 2021-06-19 NOTE — PROGRESS NOTES
Patient was a no show for scheduled Sw appointment. It looks like she has not been seen in clinic seen 2014.

## 2021-06-22 RX ORDER — ALUMINUM HYDROXIDE, MAGNESIUM HYDROXIDE, SIMETHICONE 400; 400; 40 MG/5ML; MG/5ML; MG/5ML
SUSPENSION ORAL
Qty: 355 ML | Refills: 0 | Status: SHIPPED | OUTPATIENT
Start: 2021-06-22 | End: 2022-01-24

## 2021-06-22 RX ORDER — GINSENG 100 MG
CAPSULE ORAL
Qty: 28 G | Refills: 3 | Status: SHIPPED | OUTPATIENT
Start: 2021-06-22 | End: 2023-03-01

## 2021-11-03 DIAGNOSIS — K21.9 GASTROESOPHAGEAL REFLUX DISEASE WITHOUT ESOPHAGITIS: ICD-10-CM

## 2021-11-04 RX ORDER — MAGNESIUM HYDROXIDE/ALUMINUM HYDROXICE/SIMETHICONE 120; 1200; 1200 MG/30ML; MG/30ML; MG/30ML
SUSPENSION ORAL
Qty: 355 ML | Refills: 0 | OUTPATIENT
Start: 2021-11-04

## 2022-01-20 DIAGNOSIS — M79.18 MUSCULOSKELETAL PAIN: ICD-10-CM

## 2022-01-20 DIAGNOSIS — K21.9 GASTROESOPHAGEAL REFLUX DISEASE WITHOUT ESOPHAGITIS: ICD-10-CM

## 2022-01-24 RX ORDER — ALUMINUM HYDROXIDE, MAGNESIUM HYDROXIDE, SIMETHICONE 400; 400; 40 MG/5ML; MG/5ML; MG/5ML
SUSPENSION ORAL
Qty: 355 ML | Refills: 0 | Status: SHIPPED | OUTPATIENT
Start: 2022-01-24 | End: 2023-03-01

## 2022-01-24 RX ORDER — PSEUDOEPHED/ACETAMINOPH/DIPHEN 30MG-500MG
TABLET ORAL
Qty: 100 TABLET | Refills: 3 | Status: SHIPPED | OUTPATIENT
Start: 2022-01-24 | End: 2023-03-01

## 2023-02-28 NOTE — PROGRESS NOTES
Assessment & Plan   Briana is a 28 year old male with no acute concerns who presents today for medication refills.       Musculoskeletal pain  Takes Tylenol PRN for pain. Requesting a refill.   - acetaminophen (ACETAMINOPHEN EXTRA STRENGTH) 500 MG tablet  Dispense: 100 tablet; Refill: 3    Gastroesophageal reflux disease without esophagitis  Stable, chronic. Requesting refill of simethicone and Antacid for reflux and gas symptoms. Tolerates well, these help his symptoms.   - simethicone (MYLICON) 80 MG chewable tablet  Dispense: 90 tablet; Refill: 4  - alum & mag hydroxide-simethicone (ANTACID FAST ACTING) 200-200-20 MG/5ML SUSP suspension  Dispense: 355 mL; Refill: 0    Dry skin  Requesting moisturizing lotion/cream for dry skin  - Eucerin external lotion  Dispense: 240 mL; Refill: 3    Epistaxis  Stable, chronic. Has a history of epistaxis, bacitracin ointment has worked well for him in the past. Requesting a refill.   - bacitracin 500 UNIT/GM OINT  Dispense: 28 g; Refill: 3      Follow up:  Return if symptoms worsen or fail to improve, for Follow up, Routine preventive.     Discussed with attending, Dr. Mena.     Tiffany Chase, DO PGY1  [facility]    Subjective   Briana is a 28 year old accompanied by his mother, sister and Claudia interpretor, presenting for the following health issues:  Recheck Medication (Talk about medication )      HPI   Briana is a 28 year old with mom and sister who presents for medication refills. No concerns. Reports that patient's symptoms are much improved with the antacid medications and simethicone. Also reports that the bacitracin ointment has helped with epistaxis, would like refills. Occasionally gets dry skin, would like a moisturizing cream/lotion.       Review of Systems   Constitutional, HEENT, cardiovascular, pulmonary, gi and gu systems are negative, except as otherwise noted.      Objective    BP (!) 134/91   Pulse 79   Temp (!) 96.4  F (35.8  C)  (Tympanic)   Resp 24   Wt 73.9 kg (163 lb)   SpO2 97%   BMI 27.12 kg/m    Body mass index is 27.12 kg/m .  Physical Exam   GENERAL: healthy, alert and no distress  RESP: lungs clear to auscultation - no rales, rhonchi or wheezes  CV: regular rate and rhythm, normal S1 S2, no S3 or S4, no murmur, click or rub  ABDOMEN: soft, nontender, no hepatosplenomegaly, no masses and bowel sounds normal  MS: no gross musculoskeletal defects noted  SKIN: no rash on exposed skin    ----- Service Performed and Documented by Resident or Fellow ------

## 2023-03-01 ENCOUNTER — OFFICE VISIT (OUTPATIENT)
Dept: FAMILY MEDICINE | Facility: CLINIC | Age: 28
End: 2023-03-01
Payer: MEDICAID

## 2023-03-01 VITALS
OXYGEN SATURATION: 97 % | BODY MASS INDEX: 27.12 KG/M2 | TEMPERATURE: 96.4 F | RESPIRATION RATE: 24 BRPM | SYSTOLIC BLOOD PRESSURE: 134 MMHG | WEIGHT: 163 LBS | DIASTOLIC BLOOD PRESSURE: 91 MMHG | HEART RATE: 79 BPM

## 2023-03-01 DIAGNOSIS — K21.9 GASTROESOPHAGEAL REFLUX DISEASE WITHOUT ESOPHAGITIS: ICD-10-CM

## 2023-03-01 DIAGNOSIS — R04.0 EPISTAXIS: ICD-10-CM

## 2023-03-01 DIAGNOSIS — L85.3 DRY SKIN: Primary | ICD-10-CM

## 2023-03-01 DIAGNOSIS — M79.18 MUSCULOSKELETAL PAIN: ICD-10-CM

## 2023-03-01 PROCEDURE — 99214 OFFICE O/P EST MOD 30 MIN: CPT | Mod: 25

## 2023-03-01 PROCEDURE — T1013 SIGN LANG/ORAL INTERPRETER: HCPCS

## 2023-03-01 RX ORDER — LANOLIN ALCOHOL/MO/W.PET/CERES
CREAM (GRAM) TOPICAL
Qty: 240 ML | Refills: 3 | Status: SHIPPED | OUTPATIENT
Start: 2023-03-01 | End: 2024-05-28

## 2023-03-01 RX ORDER — MAGNESIUM HYDROXIDE/ALUMINUM HYDROXICE/SIMETHICONE 120; 1200; 1200 MG/30ML; MG/30ML; MG/30ML
SUSPENSION ORAL
Qty: 355 ML | Refills: 0 | Status: SHIPPED | OUTPATIENT
Start: 2023-03-01 | End: 2023-05-17

## 2023-03-01 RX ORDER — SIMETHICONE 80 MG
80 TABLET,CHEWABLE ORAL EVERY 6 HOURS PRN
Qty: 90 TABLET | Refills: 4 | Status: SHIPPED | OUTPATIENT
Start: 2023-03-01 | End: 2024-05-28

## 2023-03-01 RX ORDER — ACETAMINOPHEN 500 MG
TABLET ORAL
Qty: 100 TABLET | Refills: 3 | Status: SHIPPED | OUTPATIENT
Start: 2023-03-01 | End: 2024-05-28

## 2023-03-01 RX ORDER — ALUMINA, MAGNESIA, AND SIMETHICONE 2400; 2400; 240 MG/30ML; MG/30ML; MG/30ML
SUSPENSION ORAL
Qty: 355 ML | Refills: 0 | Status: SHIPPED | OUTPATIENT
Start: 2023-03-01 | End: 2023-03-01

## 2023-03-01 RX ORDER — GINSENG 100 MG
CAPSULE ORAL
Qty: 28 G | Refills: 3 | Status: SHIPPED | OUTPATIENT
Start: 2023-03-01 | End: 2024-05-28

## 2023-03-01 NOTE — PROGRESS NOTES
Preceptor Attestation:    I discussed the patient with the resident and evaluated the patient in person. I have verified the content of the note, which accurately reflects my assessment of the patient and the plan of care.   Supervising Physician:  Ronak Mena MD.

## 2023-05-17 DIAGNOSIS — K21.9 GASTROESOPHAGEAL REFLUX DISEASE WITHOUT ESOPHAGITIS: ICD-10-CM

## 2023-05-17 RX ORDER — MAGNESIUM HYDROXIDE/ALUMINUM HYDROXICE/SIMETHICONE 120; 1200; 1200 MG/30ML; MG/30ML; MG/30ML
SUSPENSION ORAL
Qty: 355 ML | Refills: 0 | Status: SHIPPED | OUTPATIENT
Start: 2023-05-17 | End: 2024-07-02

## 2023-05-17 NOTE — TELEPHONE ENCOUNTER
Refill for alum & mag hydroxide-simethicone (ANTACID FAST ACTING) 200-200-20 MG/5ML SUSP suspension

## 2024-05-28 ENCOUNTER — OFFICE VISIT (OUTPATIENT)
Dept: FAMILY MEDICINE | Facility: CLINIC | Age: 29
End: 2024-05-28
Payer: MEDICAID

## 2024-05-28 VITALS
OXYGEN SATURATION: 94 % | WEIGHT: 156 LBS | HEIGHT: 66 IN | BODY MASS INDEX: 25.07 KG/M2 | TEMPERATURE: 97.4 F | DIASTOLIC BLOOD PRESSURE: 96 MMHG | SYSTOLIC BLOOD PRESSURE: 134 MMHG | RESPIRATION RATE: 16 BRPM | HEART RATE: 74 BPM

## 2024-05-28 DIAGNOSIS — R03.0 ELEVATED BLOOD PRESSURE READING WITHOUT DIAGNOSIS OF HYPERTENSION: ICD-10-CM

## 2024-05-28 DIAGNOSIS — H72.92 PERFORATED LEFT TYMPANIC MEMBRANE ON EXAMINATION: ICD-10-CM

## 2024-05-28 DIAGNOSIS — R05.1 ACUTE COUGH: Primary | ICD-10-CM

## 2024-05-28 PROBLEM — F80.1 EXPRESSIVE LANGUAGE DISORDER: Status: ACTIVE | Noted: 2024-05-28

## 2024-05-28 PROBLEM — G47.00 INSOMNIA: Status: RESOLVED | Noted: 2024-05-28 | Resolved: 2024-05-28

## 2024-05-28 PROBLEM — B19.10 HEPATITIS B VIRUS INFECTION: Status: RESOLVED | Noted: 2024-05-28 | Resolved: 2024-05-28

## 2024-05-28 PROBLEM — F06.30 MOOD DISORDER IN CONDITIONS CLASSIFIED ELSEWHERE: Status: RESOLVED | Noted: 2024-05-28 | Resolved: 2024-05-28

## 2024-05-28 PROCEDURE — 99213 OFFICE O/P EST LOW 20 MIN: CPT | Mod: GC

## 2024-05-28 PROCEDURE — T1013 SIGN LANG/ORAL INTERPRETER: HCPCS

## 2024-05-28 RX ORDER — GUAIFENESIN 600 MG/1
1200 TABLET, EXTENDED RELEASE ORAL 2 TIMES DAILY PRN
Qty: 60 TABLET | Refills: 0 | Status: SHIPPED | OUTPATIENT
Start: 2024-05-28

## 2024-05-28 NOTE — PATIENT INSTRUCTIONS
Thank you for coming into clinic today!     your prescription from the pharmacy  Schedule appointment at  if new or worsening symptoms  You will be contacted to schedule ENT appt  Sign up for MyChart to receive results, view appointments, and communicate with your healthcare team  Call [facility] at [phone] with questions or concerns    Take care,  Eloisa Zepeda MD

## 2024-05-28 NOTE — PROGRESS NOTES
Assessment & Plan     Acute cough  Presents with 1 week of productive cough, sore throat, and runny nose most likely due to viral illness.  Low concern for pneumonia with reassuring history and exam.  Discussed maintaining good hydration, Tylenol as needed, honey to coat the throat, and use of Mucinex as needed.  Discussed return precautions.  - guaiFENesin (MUCINEX) 600 MG 12 hr tablet  Dispense: 60 tablet; Refill: 0    Perforated left tympanic membrane on examination  Tympanic membrane perforation on left side with reports by family of hearing loss over the course of many years.  Does have significant developmental delay.  Will refer to ENT for formal hearing testing and further management.  - Adult ENT  Referral    Elevated blood pressure reading without diagnosis of hypertension  Noted to have mildly elevated blood pressures.  Not previously treated for hypertension.  Will note for subsequent visits.    Return in about 4 weeks (around 6/25/2024), or if symptoms worsen or fail to improve, for Routine preventive, with any available provider.    Briana Warren is a 29 year old, presenting for the following health issues:  Cold Symptoms (Runny nose, cough, sore throat been going on for few days now) and Headache        5/28/2024     1:37 PM   Additional Questions   Roomed by ML   Accompanied by family of 2         5/28/2024    Information    services provided? Yes   Gumaro Taylor   Type of interpretation provided Face-to-face    name Mark Fernández    Agency [employer]     HPI     Presents with 1 week of productive cough, sore throat, and runny nose with overall improvement on Tylenol and cough drops.  Denies significant shortness of breath.  Has had some phlegm production with cough.  No fever.  Has been eating and drinking normally.  Normal output.  No known sick contacts.     Patient has history of significant developmental delay and per reports of family is  "nonverbal.  Does receive PCA services through [employer].  Family is unaware if he has an active .    Family does share that they have concerns regarding his hearing.  They have noticed this for many years.      Objective    BP (!) 134/96 (BP Location: Left arm, Patient Position: Sitting, Cuff Size: Adult Regular)   Pulse 74   Temp 97.4  F (36.3  C) (Oral)   Resp 16   Ht 1.685 m (5' 6.34\")   Wt 70.8 kg (156 lb)   SpO2 94%   BMI 24.92 kg/m    Body mass index is 24.92 kg/m .  Physical Exam   GENERAL: alert and no distress  EYES: Eyes grossly normal to inspection, PERRL and conjunctivae and sclerae normal  HENT: normal cephalic/atraumatic, right ear: normal: no effusions, no erythema, normal landmarks, left ear: perforation left TM, nose and mouth without ulcers or lesions, oropharynx with mild erythema, and oral mucous membranes moist  NECK: no adenopathy, no asymmetry, masses, or scars  RESP: lungs clear to auscultation - no rales, rhonchi or wheezes  CV: regular rate and rhythm, normal S1 S2, no S3 or S4, no murmur, click or rub, no peripheral edema   MS: no gross musculoskeletal defects noted, no edema  SKIN: no suspicious lesions or rashes  PSYCH: Nonverbal, smiles occasionally, follows commands        Signed Electronically by: Eloisa Zepeda MD    "

## 2024-05-28 NOTE — PROGRESS NOTES
Preceptor Attestation:    I discussed the patient with the resident and evaluated the patient in person. I have verified the content of the note, which accurately reflects my assessment of the patient and the plan of care.   Supervising Physician:  Graham Last MD.

## 2024-07-02 ENCOUNTER — OFFICE VISIT (OUTPATIENT)
Dept: FAMILY MEDICINE | Facility: CLINIC | Age: 29
End: 2024-07-02
Payer: MEDICAID

## 2024-07-02 VITALS
HEIGHT: 65 IN | BODY MASS INDEX: 26.39 KG/M2 | TEMPERATURE: 98.3 F | SYSTOLIC BLOOD PRESSURE: 132 MMHG | WEIGHT: 158.4 LBS | OXYGEN SATURATION: 96 % | RESPIRATION RATE: 20 BRPM | HEART RATE: 71 BPM | DIASTOLIC BLOOD PRESSURE: 103 MMHG

## 2024-07-02 DIAGNOSIS — Z11.59 NEED FOR HEPATITIS C SCREENING TEST: ICD-10-CM

## 2024-07-02 DIAGNOSIS — Z11.59 NEED FOR HEPATITIS B SCREENING TEST: ICD-10-CM

## 2024-07-02 DIAGNOSIS — Z11.4 SCREENING FOR HIV (HUMAN IMMUNODEFICIENCY VIRUS): ICD-10-CM

## 2024-07-02 DIAGNOSIS — K21.9 GASTROESOPHAGEAL REFLUX DISEASE WITHOUT ESOPHAGITIS: ICD-10-CM

## 2024-07-02 DIAGNOSIS — Z00.01 ENCOUNTER FOR ROUTINE ADULT MEDICAL EXAM WITH ABNORMAL FINDINGS: Primary | ICD-10-CM

## 2024-07-02 DIAGNOSIS — I10 ESSENTIAL HYPERTENSION: ICD-10-CM

## 2024-07-02 LAB
ALBUMIN SERPL BCG-MCNC: 4.8 G/DL (ref 3.5–5.2)
ALBUMIN UR-MCNC: NEGATIVE MG/DL
ALP SERPL-CCNC: 107 U/L (ref 40–150)
ALT SERPL W P-5'-P-CCNC: 52 U/L (ref 0–70)
ANION GAP SERPL CALCULATED.3IONS-SCNC: 10 MMOL/L (ref 7–15)
APPEARANCE UR: CLEAR
AST SERPL W P-5'-P-CCNC: 35 U/L (ref 0–45)
BASOPHILS # BLD AUTO: 0.1 10E3/UL (ref 0–0.2)
BASOPHILS NFR BLD AUTO: 1 %
BILIRUB SERPL-MCNC: 1.1 MG/DL
BILIRUB UR QL STRIP: NEGATIVE
BUN SERPL-MCNC: 9 MG/DL (ref 6–20)
CALCIUM SERPL-MCNC: 9.7 MG/DL (ref 8.6–10)
CHLORIDE SERPL-SCNC: 100 MMOL/L (ref 98–107)
CHOLEST SERPL-MCNC: 211 MG/DL
COLOR UR AUTO: YELLOW
CREAT SERPL-MCNC: 0.98 MG/DL (ref 0.67–1.17)
CREAT UR-MCNC: 113 MG/DL
DEPRECATED HCO3 PLAS-SCNC: 29 MMOL/L (ref 22–29)
EGFRCR SERPLBLD CKD-EPI 2021: >90 ML/MIN/1.73M2
EOSINOPHIL # BLD AUTO: 0.5 10E3/UL (ref 0–0.7)
EOSINOPHIL NFR BLD AUTO: 6 %
ERYTHROCYTE [DISTWIDTH] IN BLOOD BY AUTOMATED COUNT: 12.3 % (ref 10–15)
FASTING STATUS PATIENT QL REPORTED: ABNORMAL
FASTING STATUS PATIENT QL REPORTED: ABNORMAL
GLUCOSE SERPL-MCNC: 100 MG/DL (ref 70–99)
GLUCOSE UR STRIP-MCNC: NEGATIVE MG/DL
HBA1C MFR BLD: 5.7 % (ref 0–5.6)
HCT VFR BLD AUTO: 50.7 % (ref 40–53)
HDLC SERPL-MCNC: 38 MG/DL
HGB BLD-MCNC: 16.8 G/DL (ref 13.3–17.7)
HGB UR QL STRIP: NEGATIVE
HIV 1+2 AB+HIV1 P24 AG SERPL QL IA: NONREACTIVE
IMM GRANULOCYTES # BLD: 0 10E3/UL
IMM GRANULOCYTES NFR BLD: 0 %
KETONES UR STRIP-MCNC: NEGATIVE MG/DL
LDLC SERPL CALC-MCNC: 128 MG/DL
LEUKOCYTE ESTERASE UR QL STRIP: NEGATIVE
LYMPHOCYTES # BLD AUTO: 3.3 10E3/UL (ref 0.8–5.3)
LYMPHOCYTES NFR BLD AUTO: 35 %
MCH RBC QN AUTO: 27.7 PG (ref 26.5–33)
MCHC RBC AUTO-ENTMCNC: 33.1 G/DL (ref 31.5–36.5)
MCV RBC AUTO: 84 FL (ref 78–100)
MICROALBUMIN UR-MCNC: <12 MG/L
MICROALBUMIN/CREAT UR: NORMAL MG/G{CREAT}
MONOCYTES # BLD AUTO: 0.7 10E3/UL (ref 0–1.3)
MONOCYTES NFR BLD AUTO: 7 %
NEUTROPHILS # BLD AUTO: 4.8 10E3/UL (ref 1.6–8.3)
NEUTROPHILS NFR BLD AUTO: 51 %
NITRATE UR QL: NEGATIVE
NONHDLC SERPL-MCNC: 173 MG/DL
PH UR STRIP: 6 [PH] (ref 5–8)
PLATELET # BLD AUTO: 250 10E3/UL (ref 150–450)
POTASSIUM SERPL-SCNC: 4.1 MMOL/L (ref 3.4–5.3)
PROT SERPL-MCNC: 8.4 G/DL (ref 6.4–8.3)
RBC # BLD AUTO: 6.07 10E6/UL (ref 4.4–5.9)
SODIUM SERPL-SCNC: 139 MMOL/L (ref 135–145)
SP GR UR STRIP: 1.01 (ref 1–1.03)
TRIGL SERPL-MCNC: 226 MG/DL
UROBILINOGEN UR STRIP-ACNC: 0.2 E.U./DL
WBC # BLD AUTO: 9.4 10E3/UL (ref 4–11)

## 2024-07-02 PROCEDURE — 99214 OFFICE O/P EST MOD 30 MIN: CPT | Mod: 25

## 2024-07-02 PROCEDURE — 87389 HIV-1 AG W/HIV-1&-2 AB AG IA: CPT

## 2024-07-02 PROCEDURE — 90471 IMMUNIZATION ADMIN: CPT

## 2024-07-02 PROCEDURE — T1013 SIGN LANG/ORAL INTERPRETER: HCPCS

## 2024-07-02 PROCEDURE — 82043 UR ALBUMIN QUANTITATIVE: CPT

## 2024-07-02 PROCEDURE — 86704 HEP B CORE ANTIBODY TOTAL: CPT

## 2024-07-02 PROCEDURE — 36415 COLL VENOUS BLD VENIPUNCTURE: CPT

## 2024-07-02 PROCEDURE — 82570 ASSAY OF URINE CREATININE: CPT

## 2024-07-02 PROCEDURE — 90715 TDAP VACCINE 7 YRS/> IM: CPT

## 2024-07-02 PROCEDURE — 87340 HEPATITIS B SURFACE AG IA: CPT

## 2024-07-02 PROCEDURE — 81003 URINALYSIS AUTO W/O SCOPE: CPT

## 2024-07-02 PROCEDURE — 80061 LIPID PANEL: CPT

## 2024-07-02 PROCEDURE — 99395 PREV VISIT EST AGE 18-39: CPT | Mod: 25

## 2024-07-02 PROCEDURE — 80053 COMPREHEN METABOLIC PANEL: CPT

## 2024-07-02 PROCEDURE — 87341 HEP B SURFACE AG NEUTRLZJ IA: CPT

## 2024-07-02 PROCEDURE — 83036 HEMOGLOBIN GLYCOSYLATED A1C: CPT

## 2024-07-02 PROCEDURE — 86803 HEPATITIS C AB TEST: CPT

## 2024-07-02 PROCEDURE — 85025 COMPLETE CBC W/AUTO DIFF WBC: CPT

## 2024-07-02 PROCEDURE — 86706 HEP B SURFACE ANTIBODY: CPT

## 2024-07-02 RX ORDER — AMLODIPINE BESYLATE 5 MG/1
5 TABLET ORAL DAILY
Qty: 30 TABLET | Refills: 0 | Status: SHIPPED | OUTPATIENT
Start: 2024-07-02 | End: 2024-08-06

## 2024-07-02 RX ORDER — MAGNESIUM HYDROXIDE/ALUMINUM HYDROXICE/SIMETHICONE 120; 1200; 1200 MG/30ML; MG/30ML; MG/30ML
SUSPENSION ORAL
Qty: 355 ML | Refills: 1 | Status: SHIPPED | OUTPATIENT
Start: 2024-07-02

## 2024-07-02 SDOH — HEALTH STABILITY: PHYSICAL HEALTH: ON AVERAGE, HOW MANY DAYS PER WEEK DO YOU ENGAGE IN MODERATE TO STRENUOUS EXERCISE (LIKE A BRISK WALK)?: 4 DAYS

## 2024-07-02 ASSESSMENT — SOCIAL DETERMINANTS OF HEALTH (SDOH): HOW OFTEN DO YOU GET TOGETHER WITH FRIENDS OR RELATIVES?: MORE THAN THREE TIMES A WEEK

## 2024-07-02 NOTE — PATIENT INSTRUCTIONS
"Patient Education   Preventive Care Advice   This is general advice we often give to help people stay healthy. Your care team may have specific advice just for you. Please talk to your care team about your own preventive care needs.  Lifestyle  Exercise at least 150 minutes each week (30 minutes a day, 5 days a week).  Do muscle strengthening activities 2 days a week. These help control your weight and prevent disease.  No smoking.  Wear sunscreen to prevent skin cancer.  Have your home tested for radon every 2 to 5 years. Radon is a colorless, odorless gas that can harm your lungs. To learn more, go to www.health.[facility].mn.us and search for \"Radon in Homes.\"  Keep guns unloaded and locked up in a safe place like a safe or gun vault, or, use a gun lock and hide the keys. Always lock away bullets separately. To learn more, visit Reveal.[place].gov and search for \"safe gun storage.\"  Nutrition  Eat 5 or more servings of fruits and vegetables each day.  Try wheat bread, brown rice and whole grain pasta (instead of white bread, rice, and pasta).  Get enough calcium and vitamin D. Check the label on foods and aim for 100% of the RDA (recommended daily allowance).  Regular exams  Have a dental exam and cleaning every 6 months.  See your health care team every year to talk about:  Any changes in your health.  Any medicines your care team has prescribed.  Preventive care, family planning, and ways to prevent chronic diseases.  Shots (vaccines)   HPV shots (up to age 26), if you've never had them before.  Hepatitis B shots (up to age 59), if you've never had them before.  COVID-19 shot: Get this shot when it's due.  Flu shot: Get a flu shot every year.  Tetanus shot: Get a tetanus shot every 10 years.  Pneumococcal, hepatitis A, and RSV shots: Ask your care team if you need these based on your risk.  Shingles shot (for age 50 and up).  General health tests  Diabetes screening:  Starting at age 35, Get screened for diabetes at least " every 3 years.  If you are younger than age 35, ask your care team if you should be screened for diabetes.  Cholesterol test: At age 39, start having a cholesterol test every 5 years, or more often if advised.  Bone density scan (DEXA): At age 50, ask your care team if you should have this scan for osteoporosis (brittle bones).  Hepatitis C: Get tested at least once in your life.  Abdominal aortic aneurysm screening: Talk to your doctor about having this screening if you:  Have ever smoked; and  Are biologically male; and  Are between the ages of 65 and 75.  STIs (sexually transmitted infections)  Before age 24: Ask your care team if you should be screened for STIs.  After age 24: Get screened for STIs if you're at risk. You are at risk for STIs (including HIV) if:  You are sexually active with more than one person.  You don't use condoms every time.  You or a partner was diagnosed with a sexually transmitted infection.  If you are at risk for HIV, ask about PrEP medicine to prevent HIV.  Get tested for HIV at least once in your life, whether you are at risk for HIV or not.  Cancer screening tests  Cervical cancer screening: If you have a cervix, begin getting regular cervical cancer screening tests at age 21. Most people who have regular screenings with normal results can stop after age 65. Talk about this with your provider.  Breast cancer scan (mammogram): If you've ever had breasts, begin having regular mammograms starting at age 40. This is a scan to check for breast cancer.  Colon cancer screening: It is important to start screening for colon cancer at age 45.  Have a colonoscopy test every 10 years (or more often if you're at risk) Or, ask your provider about stool tests like a FIT test every year or Cologuard test every 3 years.  To learn more about your testing options, visit: www.[employer] Decisions/556675.pdf.  For help making a decision, visit: andre/yd20490.  Prostate cancer screening test: If you have a  prostate and are age 55 to 69, ask your provider if you would benefit from a yearly prostate cancer screening test.  Lung cancer screening: If you are a current or former smoker age 50 to 80, ask your care team if ongoing lung cancer screenings are right for you.  For informational purposes only. Not to replace the advice of your health care provider. Copyright   2023 [facility]. All rights reserved. Clinically reviewed by the [facility] Transitions Program. [employer] 474158 - REV 04/24.

## 2024-07-02 NOTE — PROGRESS NOTES
Prior to immunization administration, verified patients identity using patient s name and date of birth. Please see Immunization Activity for additional information.     Screening Questionnaire for Adult Immunization    Are you sick today?   No   Do you have allergies to medications, food, a vaccine component or latex?   No   Have you ever had a serious reaction after receiving a vaccination?   No   Do you have a long-term health problem with heart, lung, kidney, or metabolic disease (e.g., diabetes), asthma, a blood disorder, no spleen, complement component deficiency, a cochlear implant, or a spinal fluid leak?  Are you on long-term aspirin therapy?   No   Do you have cancer, leukemia, HIV/AIDS, or any other immune system problem?   No   Do you have a parent, brother, or sister with an immune system problem?   No   In the past 3 months, have you taken medications that affect  your immune system, such as prednisone, other steroids, or anticancer drugs; drugs for the treatment of rheumatoid arthritis, Crohn s disease, or psoriasis; or have you had radiation treatments?   No   Have you had a seizure, or a brain or other nervous system problem?   No   During the past year, have you received a transfusion of blood or blood    products, or been given immune (gamma) globulin or antiviral drug?   No   For women: Are you pregnant or is there a chance you could become       pregnant during the next month?   No   Have you received any vaccinations in the past 4 weeks?   No     Immunization questionnaire answers were all negative.      Patient instructed to remain in clinic for 15 minutes afterwards, and to report any adverse reactions.     Screening performed by [facility] CMA on 7/2/2024 at 10:55 AM.

## 2024-07-02 NOTE — PROGRESS NOTES
Preventive Care Visit  [facility]  Guido Yanez DO, Family Medicine  Jul 2, 2024      Assessment & Plan     Encounter for routine adult medical exam with abnormal findings  Screening for HIV (human immunodeficiency virus)  Need for hepatitis C screening test  Need for hepatitis B screening test  Overall Briana is doing well.  He is taking care of by his family. He is able to answer simple yes or no questions and follow-simple commands, but has difficulty with expressive communication.   There was a history of resolved HepB in the chart, I do not see and previous hepatitis serologies, so will order today to clarify this as well as check his immunity. Screening for lipid disorders and diabetes ordered as well.   - Comprehensive metabolic panel  - CBC with Platelets & Differential  - Urine Macroscopic with reflex to Microscopic  - Albumin Random Urine Quantitative with Creat Ratio  - Hemoglobin A1c  - Lipid panel reflex to direct LDL Non-fasting  - HIV Screening  - Hepatitis C Screen Reflex to HCV RNA Quant and Genotype  - Hepatitis B Surface Antibody  - Hepatitis B surface antigen  - Hepatitis B core antibody      Gastroesophageal reflux disease without esophagitis  Refill given for ongoing intermittent/mild reflux symptoms. Patient only uses meds as needed.  - alum & mag hydroxide-simethicone (ANTACID FAST ACTING) 200-200-20 MG/5ML SUSP suspension  Dispense: 355 mL; Refill: 1    Essential hypertension  Patient had high blood pressure readings today and at last visit on 5/28/24, with elevation in diastolic pressures noted. Patient reports no associated symptoms such as headache, abdominal pain, changes in bowel or bladder patterns. Patient's father has a history of high blood pressure as well.  Labs were ordered to investigate possibility of endorgan damage and to guide further management.  Initiated on 5 mg of amlodipine daily and follow-up scheduled for two weeks from today to go over lab  "results and assess how Amlodipine has been working.  Patient's father verbalized clear understanding and agreement to this treatment plan and had no further questions or concerns at this time.  - Amlodipine 5mg 1xday  - Comprehensive metabolic panel  - CBC with Platelets & Differential  - Urine Macroscopic with reflex to Microscopic  - Albumin Random Urine Quantitative with Creat Ratio  - Albumin Random Urine Quantitative with Creat Ratio  - Urine Macroscopic with reflex to Microscopic      BMI  Estimated body mass index is 26.36 kg/m  as calculated from the following:    Height as of this encounter: 1.651 m (5' 5\").    Weight as of this encounter: 71.8 kg (158 lb 6.4 oz).       Counseling  Appropriate preventive services were discussed with this patient, including applicable screening as appropriate for fall prevention, nutrition, physical activity, Tobacco-use cessation, weight loss and cognition.  Checklist reviewing preventive services available has been given to the patient.  Reviewed patient's diet, addressing concerns and/or questions.   The patient was instructed to see the dentist every 6 months.         Return in about 2 weeks (around 7/16/2024) for Follow up.    Subjective   Briana is a 29 year old, presenting for a preventive care visit with no accompanying concerns on the patients end. Patient is accompanied by parent who is the primary historian. Family reports that Briana has not complained of any pains, headaches, or other concerns as of late.     for the following:  Physical (cpe) and Medication Reconciliation (Med reviewed)      HPI  Patient had high blood pressure at last visit on 5/28/24 and continues to have high blood pressure today. Patient reports no associated symptoms with high blood pressure such as headache or pain. Patient significantly has family history of high blood pressure in his father. No reported changes in bowel or bladder patterns. Otherwise, patient and family have no complaints " or additional concerns.         7/2/2024   General Health   How would you rate your overall physical health? Good   Feel stress (tense, anxious, or unable to sleep) Not at all            7/2/2024   Nutrition   Three or more servings of calcium each day? Yes   Diet: I don't know   How many servings of fruit and vegetables per day? 4 or more   How many sweetened beverages each day? (!) I DON'T KNOW            7/2/2024   Exercise   Days per week of moderate/strenous exercise 4 days            7/2/2024   Social Factors   Frequency of gathering with friends or relatives More than three times a week   Worry food won't last until get money to buy more No   Food not last or not have enough money for food? No   Do you have housing? (Housing is defined as stable permanent housing and does not include staying ouside in a car, in a tent, in an abandoned building, in an overnight shelter, or couch-surfing.) Yes   Are you worried about losing your housing? No   Lack of transportation? No   Unable to get utilities (heat,electricity)? No            7/2/2024   Dental   Dentist two times every year? (!) NO            7/2/2024   TB Screening   Were you born outside of the [place]? No            Today's PHQ-2 Score:       7/2/2024    10:16 AM   PHQ-2 ( 1999 [employer])   Q1: Little interest or pleasure in doing things 0   Q2: Feeling down, depressed or hopeless 0   PHQ-2 Score 0   Q1: Little interest or pleasure in doing things Not at all   Q2: Feeling down, depressed or hopeless Not at all   PHQ-2 Score 0           7/2/2024   Substance Use   Alcohol more than 3/day or more than 7/wk No   Do you use any other substances recreationally? No        Social History     Tobacco Use    Smoking status: Never    Smokeless tobacco: Never           7/2/2024   One time HIV Screening   Previous HIV test? No          7/2/2024   STI Screening   New sexual partner(s) since last STI/HIV test? No            7/2/2024   Contraception/Family Planning   Questions  "about contraception or family planning No            Review of Systems  ROS obtained primarily through questioning of patient's father.   Constitutional, HEENT, cardiovascular, pulmonary, gi and gu systems are negative, except as otherwise noted.     Objective    Exam  BP (!) 132/103 (BP Location: Left arm, Patient Position: Sitting, Cuff Size: Adult Regular)   Pulse 71   Temp 98.3  F (36.8  C) (Oral)   Resp 20   Ht 1.651 m (5' 5\")   Wt 71.8 kg (158 lb 6.4 oz)   SpO2 96%   BMI 26.36 kg/m     Estimated body mass index is 26.36 kg/m  as calculated from the following:    Height as of this encounter: 1.651 m (5' 5\").    Weight as of this encounter: 71.8 kg (158 lb 6.4 oz).    Physical Exam  Constitutional:       General: He is not in acute distress.     Appearance: Normal appearance. He is not ill-appearing.   HENT:      Head: Atraumatic.      Right Ear: Tympanic membrane, ear canal and external ear normal.      Left Ear: Tympanic membrane, ear canal and external ear normal.      Mouth/Throat:      Mouth: Mucous membranes are moist.   Cardiovascular:      Rate and Rhythm: Normal rate and regular rhythm.      Pulses: Normal pulses.      Heart sounds: No murmur heard.     No gallop.   Pulmonary:      Effort: Pulmonary effort is normal.      Breath sounds: Normal breath sounds.   Abdominal:      General: Bowel sounds are normal.      Palpations: Abdomen is soft.   Musculoskeletal:      Cervical back: Neck supple. No rigidity or tenderness.   Skin:     General: Skin is warm.   Neurological:      Mental Status: He is alert.         Resident/Fellow Attestation   I, Guido Yanez DO, was present with the medical/BEBE student who participated in the service and in the documentation of the note.  I have verified the history and personally performed the physical exam and medical decision making.  I agree with the assessment and plan of care as documented in the note.      Guido Yanez DO  PGY3     David Ibarra, " MS3  [facility] Medical Student    Signed Electronically by: Guido Yanez DO

## 2024-07-02 NOTE — PROGRESS NOTES
Preceptor Attestation:    I discussed the patient with the resident & Medical student and evaluated the patient in person. I have verified the content of the note, which accurately reflects my assessment of the patient and the plan of care.   Supervising Physician:  Graham Last MD.      BP Readings from Last 3 Encounters:   07/02/24 (!) 132/103   05/28/24 (!) 134/96   03/01/23 (!) 134/91    Wt Readings from Last 3 Encounters:   07/02/24 71.8 kg (158 lb 6.4 oz)   05/28/24 70.8 kg (156 lb)   03/01/23 73.9 kg (163 lb)

## 2024-07-03 LAB
HBV CORE AB SERPL QL IA: REACTIVE
HBV SURFACE AB SERPL IA-ACNC: <3.5 M[IU]/ML
HBV SURFACE AB SERPL IA-ACNC: NONREACTIVE M[IU]/ML
HBV SURFACE AG SERPL QL IA: REACTIVE
HCV AB SERPL QL IA: NONREACTIVE

## 2024-08-05 NOTE — PROGRESS NOTES
"  Assessment & Plan     Hepatitis B core antibody positive  -Discussed with patient's parents that given he had positive hepatitis B core antibody as well as positive hepatitis B surface antigen, recommend further workup to investigate possibility of chronic hepatitis B.  Previously ordered LFTs were reassuring. will order below testing.  Recommended patient's parents and siblings consider screening for hepatitis B as well.  Recommended patient follow-up in 1 month  to go over lab work and discuss any possible further workup or screening.  Patient's parents verbalized clearing standing agreement this treatment plan and had no further questions or concerns at this time.  - Hep B Virus DNA Quant Real Time PCR  - Hepatitis Be antigen      Essential hypertension  -Patient was trialed on amlodipine 5 mg, however the day following initiation of this, he developed cough, fever, and chills.  Patient's parents believe this is a possible adverse effect of the medication.  I get his less likely that this is a medication reaction versus an acute viral illness.  Will initiate lisinopril 10 as an alternative and have patient follow-up in a month to recheck blood pressure.    - lisinopril (ZESTRIL) 10 MG tablet  Dispense: 90 tablet; Refill: 3    Tension headache  Patient's parents report he has occasional complaints of headaches.  These appear to be further description tension type headaches.  Will send a prescription for Tylenol to pharmacy on file they can use for headaches or mild pain.  And follow-up on an as-needed basis if any concerns arise.  - acetaminophen (TYLENOL) 500 MG tablet  Dispense: 100 tablet; Refill: 2      BMI  Estimated body mass index is 25.79 kg/m  as calculated from the following:    Height as of this encounter: 1.651 m (5' 5\").    Weight as of this encounter: 70.3 kg (155 lb).       Return in about 1 month (around 9/6/2024) for Follow up.    Briana Warren is a 29 year old, presenting for the following " "health issues:  Hypertension and Results    HPI   Briana was seen on 7/2/2024. Lab testing indicated chronic Hep B. AST and ALT reassuring.  Patient's parents state that sister has a history of chronic otitis.  They deny that he has been having any nausea, vomiting, shortness of breath, chest pain, changes in bowel or bladder patterns.  They deny any additional concerns at this point        Review of Systems  -ROS determined through discussion with patient's parents.    Constitutional, neuro, ENT, endocrine, pulmonary, cardiac, gastrointestinal, genitourinary, musculoskeletal, integument and psychiatric systems are negative, except as otherwise noted.      Objective    BP (!) 147/104   Pulse 64   Temp 99  F (37.2  C) (Oral)   Resp 20   Ht 1.651 m (5' 5\")   Wt 70.3 kg (155 lb)   SpO2 96%   BMI 25.79 kg/m    Body mass index is 25.79 kg/m .  Physical Exam  Constitutional:       General: He is not in acute distress.     Appearance: Normal appearance. He is not diaphoretic.   HENT:      Head: Normocephalic and atraumatic.      Right Ear: External ear normal.      Left Ear: External ear normal.      Nose: Nose normal.   Cardiovascular:      Rate and Rhythm: Normal rate and regular rhythm.      Heart sounds: No murmur heard.     No friction rub. No gallop.   Pulmonary:      Effort: Pulmonary effort is normal. No respiratory distress.      Breath sounds: Normal breath sounds. No wheezing or rales.   Chest:      Chest wall: No tenderness.   Abdominal:      General: Abdomen is flat. Bowel sounds are normal. There is no distension.      Tenderness: There is no abdominal tenderness. There is no guarding or rebound.   Neurological:      Mental Status: He is alert. Mental status is at baseline.   Psychiatric:      Comments: -Patient relatively nonverbal at baseline.  Is able to follow commands.              Precepted with Dr. Nikolas Martinez MD who agrees with assessment and plan as outlined above    Signed " Electronically by: Guido Yanez, DO

## 2024-08-06 ENCOUNTER — OFFICE VISIT (OUTPATIENT)
Dept: FAMILY MEDICINE | Facility: CLINIC | Age: 29
End: 2024-08-06
Payer: MEDICAID

## 2024-08-06 VITALS
RESPIRATION RATE: 20 BRPM | OXYGEN SATURATION: 96 % | BODY MASS INDEX: 25.83 KG/M2 | HEART RATE: 64 BPM | HEIGHT: 65 IN | SYSTOLIC BLOOD PRESSURE: 147 MMHG | DIASTOLIC BLOOD PRESSURE: 104 MMHG | WEIGHT: 155 LBS | TEMPERATURE: 99 F

## 2024-08-06 DIAGNOSIS — I10 ESSENTIAL HYPERTENSION: ICD-10-CM

## 2024-08-06 DIAGNOSIS — R76.8 HEPATITIS B CORE ANTIBODY POSITIVE: Primary | ICD-10-CM

## 2024-08-06 DIAGNOSIS — G44.209 TENSION HEADACHE: ICD-10-CM

## 2024-08-06 PROCEDURE — 36415 COLL VENOUS BLD VENIPUNCTURE: CPT

## 2024-08-06 PROCEDURE — 99214 OFFICE O/P EST MOD 30 MIN: CPT | Mod: GC

## 2024-08-06 PROCEDURE — T1013 SIGN LANG/ORAL INTERPRETER: HCPCS

## 2024-08-06 PROCEDURE — 87350 HEPATITIS BE AG IA: CPT | Mod: 90

## 2024-08-06 PROCEDURE — 87517 HEPATITIS B DNA QUANT: CPT

## 2024-08-06 PROCEDURE — 99000 SPECIMEN HANDLING OFFICE-LAB: CPT

## 2024-08-06 RX ORDER — LISINOPRIL 10 MG/1
10 TABLET ORAL DAILY
Qty: 90 TABLET | Refills: 3 | Status: SHIPPED | OUTPATIENT
Start: 2024-08-06 | End: 2024-09-17

## 2024-08-06 RX ORDER — ACETAMINOPHEN 500 MG
500-1000 TABLET ORAL EVERY 8 HOURS PRN
Qty: 100 TABLET | Refills: 2 | Status: SHIPPED | OUTPATIENT
Start: 2024-08-06

## 2024-08-06 NOTE — PROGRESS NOTES
Critical Vital Report    Did patient have a critical vital during today's visit? Yes  Which vital is reporting as critical?: Blood Pressure    I personally notified the following: Provider    Action(s) taken: I left room and notified provider personally

## 2024-08-06 NOTE — PROGRESS NOTES
Preceptor Attestation:    I discussed the patient with the resident and evaluated the patient in person. I have verified the content of the note, which accurately reflects my assessment of the patient and the plan of care.   Supervising Physician:  Nikolas Martinez MD.

## 2024-08-07 LAB
HBV DNA SERPL NAA+PROBE-ACNC: 283 IU/ML
HBV DNA SERPL NAA+PROBE-LOG IU: 2.5 {LOG_IU}/ML
HBV E AG SERPL QL IA: NEGATIVE

## 2024-08-08 ENCOUNTER — TELEPHONE (OUTPATIENT)
Dept: FAMILY MEDICINE | Facility: CLINIC | Age: 29
End: 2024-08-08
Payer: MEDICAID

## 2024-08-08 ENCOUNTER — DOCUMENTATION ONLY (OUTPATIENT)
Dept: FAMILY MEDICINE | Facility: CLINIC | Age: 29
End: 2024-08-08
Payer: MEDICAID

## 2024-08-08 DIAGNOSIS — B18.1 VIRAL HEPATITIS B CHRONIC (H): Primary | ICD-10-CM

## 2024-09-16 NOTE — PROGRESS NOTES
"  Assessment & Plan     Essential hypertension  Discussed with patient patient's family that recommend increasing lisinopril to 10 mg twice daily and will order CMP to assess liver and kidney function, especially since he has history of chronic viral hepatitis C, see below.  -     lisinopril (ZESTRIL) 10 MG tablet; Take 2 tablets (20 mg) by mouth daily.  -     Comprehensive metabolic panel    Viral hepatitis B chronic (H)  Discussed with patient patient's family that we will order a abdominal ultrasound given his history of hepatitis B as well as a comprehensive metabolic panel patient patient's family verbalized clear understanding and agreement to the treatment plan and had no further questions or concerns at this time.  -     Comprehensive metabolic panel  -     US Abdomen Limited; Future    Need for prophylactic vaccination and inoculation against influenza  Other orders  -     INFLUENZA VACCINE,SPLIT VIRUS,TRIVALENT,PF(FLUZONE)    The longitudinal plan of care for the diagnosis(es)/condition(s) as documented were addressed during this visit. Due to the added complexity in care, I will continue to support Briana in the subsequent management and with ongoing continuity of care.      BMI  Estimated body mass index is 27.39 kg/m  as calculated from the following:    Height as of this encounter: 1.651 m (5' 5\").    Weight as of this encounter: 74.7 kg (164 lb 9.6 oz).         MEDICATIONS:        - Increase lisinopril to 20 mg daily       - Continue other medications without change    FUTURE APPOINTMENTS:       -Discussed to follow-up for future appointments after ultrasound has been performed to discuss results    Subjective   Briana is a 29 year old, presenting for the following health issues:  Blood Pressure Check (Follow up) and Imm/Inj (Flu Shot)    HPI   Patient was recently seen by me on 8/6/2024.  He had an elevated blood pressure to 147/104, and was started on lisinopril 10 mg.  In addition, he had noted chronic " "hepatitis B.  He was scheduled for a limited abdominal ultrasound, however they were unable to make this appointment.  Hepatitis B DNA was only mildly elevated at 283.  AST and ALT have been within the normal limit.    Pt reports taking his medication for his blood pressure as directed, however is still experiencing an elevated pressure. Pt aware of missed liver ultrasound appointment and plans to reschedule. Pt denies any HA, CP, abdominal pain, or N/V/D.      Resident/Fellow Attestation   I, Guido Yanez DO, was present with the medical/BEBE student who participated in the service and in the documentation of the note.  I have verified the history and personally performed the physical exam and medical decision making.  I agree with the assessment and plan of care as documented in the note.      Guido Yanez DO  PGY3   John Mcgrath, MS3    [facility] [employer] [facility]     Review of Systems  Constitutional, HEENT, cardiovascular, pulmonary, gi and gu systems are negative, except as otherwise noted.  ROS reviewed, see HPI for pertinent positives and negatives.  Guido Yanez DO        Objective    BP (!) 143/96   Pulse 75   Temp 98.3  F (36.8  C)   Resp 16   Ht 1.651 m (5' 5\")   Wt 74.7 kg (164 lb 9.6 oz)   SpO2 100%   BMI 27.39 kg/m    Body mass index is 27.39 kg/m .  Physical Exam   GENERAL: alert and no distress  NECK: no adenopathy, no asymmetry, masses, or scars  RESP: lungs clear to auscultation - no rales, rhonchi or wheezes  CV: regular rate and rhythm, normal S1 S2, no S3 or S4, no murmur, click or rub, no peripheral edema  ABDOMEN: soft, nontender, no hepatosplenomegaly, no masses and bowel sounds normal  MS: no gross musculoskeletal defects noted, no edema        Precepted with Dr. Roby Weathers MD who agrees with assessment and plan as outlined above    Signed Electronically by: Guido Yanez DO    "

## 2024-09-17 ENCOUNTER — OFFICE VISIT (OUTPATIENT)
Dept: FAMILY MEDICINE | Facility: CLINIC | Age: 29
End: 2024-09-17
Payer: MEDICAID

## 2024-09-17 VITALS
WEIGHT: 164.6 LBS | OXYGEN SATURATION: 100 % | DIASTOLIC BLOOD PRESSURE: 96 MMHG | TEMPERATURE: 98.3 F | HEART RATE: 75 BPM | HEIGHT: 65 IN | SYSTOLIC BLOOD PRESSURE: 143 MMHG | RESPIRATION RATE: 16 BRPM | BODY MASS INDEX: 27.42 KG/M2

## 2024-09-17 DIAGNOSIS — I10 ESSENTIAL HYPERTENSION: Primary | ICD-10-CM

## 2024-09-17 DIAGNOSIS — Z23 NEED FOR PROPHYLACTIC VACCINATION AND INOCULATION AGAINST INFLUENZA: ICD-10-CM

## 2024-09-17 DIAGNOSIS — B18.1 VIRAL HEPATITIS B CHRONIC (H): ICD-10-CM

## 2024-09-17 PROCEDURE — T1013 SIGN LANG/ORAL INTERPRETER: HCPCS

## 2024-09-17 PROCEDURE — 80053 COMPREHEN METABOLIC PANEL: CPT

## 2024-09-17 PROCEDURE — 90471 IMMUNIZATION ADMIN: CPT

## 2024-09-17 PROCEDURE — 36415 COLL VENOUS BLD VENIPUNCTURE: CPT

## 2024-09-17 PROCEDURE — 99214 OFFICE O/P EST MOD 30 MIN: CPT | Mod: 25

## 2024-09-17 PROCEDURE — 90656 IIV3 VACC NO PRSV 0.5 ML IM: CPT

## 2024-09-17 RX ORDER — LISINOPRIL 10 MG/1
20 TABLET ORAL DAILY
Qty: 180 TABLET | Refills: 1 | Status: SHIPPED | OUTPATIENT
Start: 2024-09-17

## 2024-09-17 NOTE — LETTER
September 18, 2024      Briana Rizzo  [id number] [facility] [address]        Dear ,    We are writing to inform you of your test results.    liver function testing and kidney lab values were normal, which is reassuring.     Resulted Orders   Comprehensive metabolic panel   Result Value Ref Range    Sodium 138 135 - 145 mmol/L    Potassium 4.1 3.4 - 5.3 mmol/L    Carbon Dioxide (CO2) 27 22 - 29 mmol/L    Anion Gap 9 7 - 15 mmol/L    Urea Nitrogen 7.3 6.0 - 20.0 mg/dL    Creatinine 1.02 0.67 - 1.17 mg/dL    GFR Estimate >90 >60 mL/min/1.73m2      Comment:      eGFR calculated using 2021 CKD-EPI equation.    Calcium 9.7 8.8 - 10.4 mg/dL      Comment:      Reference intervals for this test were updated on 7/16/2024 to reflect our healthy population more accurately. There may be differences in the flagging of prior results with similar values performed with this method. Those prior results can be interpreted in the context of the updated reference intervals.    Chloride 102 98 - 107 mmol/L    Glucose 99 70 - 99 mg/dL    Alkaline Phosphatase 100 40 - 150 U/L    AST 34 0 - 45 U/L    ALT 52 0 - 70 U/L    Protein Total 7.7 6.4 - 8.3 g/dL    Albumin 4.5 3.5 - 5.2 g/dL    Bilirubin Total 0.8 <=1.2 mg/dL       If you have any questions or concerns, please call the clinic at the number listed above.       Sincerely,      Roby Weathers MD

## 2024-09-17 NOTE — PATIENT INSTRUCTIONS
Please schedule abdominal ultrasound and follow-up after ultrasound is complete to discuss results and re-check blood pressure

## 2024-09-18 LAB
ALBUMIN SERPL BCG-MCNC: 4.5 G/DL (ref 3.5–5.2)
ALP SERPL-CCNC: 100 U/L (ref 40–150)
ALT SERPL W P-5'-P-CCNC: 52 U/L (ref 0–70)
ANION GAP SERPL CALCULATED.3IONS-SCNC: 9 MMOL/L (ref 7–15)
AST SERPL W P-5'-P-CCNC: 34 U/L (ref 0–45)
BILIRUB SERPL-MCNC: 0.8 MG/DL
BUN SERPL-MCNC: 7.3 MG/DL (ref 6–20)
CALCIUM SERPL-MCNC: 9.7 MG/DL (ref 8.8–10.4)
CHLORIDE SERPL-SCNC: 102 MMOL/L (ref 98–107)
CREAT SERPL-MCNC: 1.02 MG/DL (ref 0.67–1.17)
EGFRCR SERPLBLD CKD-EPI 2021: >90 ML/MIN/1.73M2
GLUCOSE SERPL-MCNC: 99 MG/DL (ref 70–99)
HCO3 SERPL-SCNC: 27 MMOL/L (ref 22–29)
POTASSIUM SERPL-SCNC: 4.1 MMOL/L (ref 3.4–5.3)
PROT SERPL-MCNC: 7.7 G/DL (ref 6.4–8.3)
SODIUM SERPL-SCNC: 138 MMOL/L (ref 135–145)

## 2024-09-18 NOTE — PROGRESS NOTES
Physician Attestation   I, Roby Weathers MD, saw this patient and agree with the findings and plan of care as documented in the note.      Items personally reviewed/procedural attestation: vitals.    Roby Weathers MD

## 2024-09-30 ENCOUNTER — ANCILLARY PROCEDURE (OUTPATIENT)
Dept: ULTRASOUND IMAGING | Facility: CLINIC | Age: 29
End: 2024-09-30
Attending: STUDENT IN AN ORGANIZED HEALTH CARE EDUCATION/TRAINING PROGRAM
Payer: MEDICAID

## 2024-09-30 DIAGNOSIS — B18.1 VIRAL HEPATITIS B CHRONIC (H): ICD-10-CM

## 2024-09-30 PROCEDURE — 76705 ECHO EXAM OF ABDOMEN: CPT | Mod: TC | Performed by: RADIOLOGY

## 2024-09-30 PROCEDURE — T1013 SIGN LANG/ORAL INTERPRETER: HCPCS

## 2024-09-30 NOTE — LETTER
"October 2, 2024      Briana Rizzo  [id number] [facility] [address]        Dear ,    We are writing to inform you of your test results.    \"Overall reassuring liver ultrasound. No sign of fibrosis. Gallbladder was normal. Liver had fatty deposits noted, so encourage good diet and exercise. Will discuss results further at our follow-up appointment on 10/23/2024.     Resulted Orders   US Abdomen Limited    Narrative    EXAM: US ABDOMEN LIMITED  LOCATION: [facility]  DATE: 9/30/2024    INDICATION: RUQ US   history of chronic hepatitis B  COMPARISON: None.  TECHNIQUE: Limited abdominal ultrasound.    FINDINGS:    GALLBLADDER: Normal. No gallstones, wall thickening, or pericholecystic fluid. Negative sonographic Bernardo's sign.    BILE DUCTS: No biliary dilatation. The common duct measures 3 mm.    LIVER: Increased echogenicity from diffuse fatty infiltration. No focal mass.    RIGHT KIDNEY: No hydronephrosis.    PANCREAS: The visualized portions are normal.    No ascites.      Impression    IMPRESSION:  1.  Gallbladder within normal limits.  2.  Hepatic steatosis.           If you have any questions or concerns, please call the clinic at the number listed above.       Sincerely,      Roby Weathers MD            "

## 2024-09-30 NOTE — NURSING NOTE
Due to patient being non-English speaking/uses sign language, an  was used for this visit. Only for face-to-face interpretation by an external agency, date and length of interpretation can be found on the scanned worksheet.     name: Juan Antonio Ray  Agency: [employer]  Language: Claudia   Telephone number: .  Type of interpretation: Face-to-face, spoken    Patient's mother and sister present throughout exam.

## 2024-10-23 ENCOUNTER — OFFICE VISIT (OUTPATIENT)
Dept: FAMILY MEDICINE | Facility: CLINIC | Age: 29
End: 2024-10-23
Payer: MEDICAID

## 2024-10-23 VITALS
RESPIRATION RATE: 24 BRPM | HEART RATE: 73 BPM | WEIGHT: 165.6 LBS | OXYGEN SATURATION: 97 % | SYSTOLIC BLOOD PRESSURE: 138 MMHG | TEMPERATURE: 98.5 F | HEIGHT: 65 IN | BODY MASS INDEX: 27.59 KG/M2 | DIASTOLIC BLOOD PRESSURE: 87 MMHG

## 2024-10-23 DIAGNOSIS — K21.9 GASTROESOPHAGEAL REFLUX DISEASE WITHOUT ESOPHAGITIS: Primary | ICD-10-CM

## 2024-10-23 DIAGNOSIS — B18.1 VIRAL HEPATITIS B CHRONIC (H): ICD-10-CM

## 2024-10-23 PROCEDURE — 99214 OFFICE O/P EST MOD 30 MIN: CPT | Mod: GC

## 2024-10-23 PROCEDURE — G2211 COMPLEX E/M VISIT ADD ON: HCPCS

## 2024-10-23 RX ORDER — FAMOTIDINE 10 MG
10 TABLET ORAL 2 TIMES DAILY PRN
Qty: 180 TABLET | Refills: 0 | Status: SHIPPED | OUTPATIENT
Start: 2024-10-23

## 2024-10-23 NOTE — PROGRESS NOTES
Preceptor Attestation:    I discussed the patient with the resident and evaluated the patient in person. I have verified the content of the note, which accurately reflects my assessment of the patient and the plan of care.   Supervising Physician:  Meng Weathers DO.

## 2024-10-23 NOTE — PROGRESS NOTES
"  Assessment & Plan     Gastroesophageal reflux disease without esophagitis  Briana affirms epigastric discomfort. It is somewhat difficult for him to characterize his discomfort due to his cognitive and expressive disorders. He does affirms an acidic taste. Was successfully treated for H pylori in the past.   - famotidine (PEPCID) 10 MG tablet  Dispense: 180 tablet; Refill: 0    Viral hepatitis B chronic (H)  US from 9/30/2024-overall reassuring. Normal gallbladder. Hepatic steatosis noted. Recommend dietary and lifestyle interventions. Recommend follow-up in about 4 months fro repeat hepatitis lab testing.     The longitudinal plan of care for the diagnosis(es)/condition(s) as documented were addressed during this visit. Due to the added complexity in care, I will continue to support Briana in the subsequent management and with ongoing continuity of care.    BMI  Estimated body mass index is 27.99 kg/m  as calculated from the following:    Height as of this encounter: 1.638 m (5' 4.5\").    Weight as of this encounter: 75.1 kg (165 lb 9.6 oz).       Return in about 4 months (around 2/23/2025).    Subjective   Briana is a 29 year old, presenting for the following health issues:  No chief complaint on file.    HPI   -Liver US performed 9/30/2024-overall reassuring. Normal gallbladder. Hepatic steatosis noted. Reports some epigastric discomfort. Does not tie this to food. He does affirms an acidic taste occasionally. Neither he or family affirms and additional concerns.        Review of Systems  Constitutional, HEENT, cardiovascular, pulmonary, gi and gu systems are negative, except as otherwise noted.  ROS reviewed, see HPI for pertinent positives and negatives.  Guido Yanez,         Objective    There were no vitals taken for this visit.  There is no height or weight on file to calculate BMI.  Physical Exam  Constitutional:       General: He is not in acute distress.     Appearance: He is not toxic-appearing or " diaphoretic.   HENT:      Head: Normocephalic and atraumatic.      Right Ear: External ear normal.      Left Ear: External ear normal.      Nose: Nose normal.      Mouth/Throat:      Mouth: Mucous membranes are moist.   Pulmonary:      Effort: No respiratory distress.   Abdominal:      General: There is no distension.      Tenderness: There is no abdominal tenderness.   Skin:     Findings: No rash.   Neurological:      Mental Status: He is alert. Mental status is at baseline.               Precepted with Dr. Meng Weathers DO who agrees with assessment and plan as outlined above    Signed Electronically by: Guido Yanez DO

## 2025-06-23 DIAGNOSIS — I10 ESSENTIAL HYPERTENSION: ICD-10-CM

## 2025-06-23 RX ORDER — LISINOPRIL 10 MG/1
20 TABLET ORAL DAILY
Qty: 180 TABLET | Refills: 1 | Status: SHIPPED | OUTPATIENT
Start: 2025-06-23

## 2025-06-23 NOTE — TELEPHONE ENCOUNTER
Name from pharmacy: LISINOPRIL 10 MG TABS 10 Tablet          Will file in chart as: lisinopril (ZESTRIL) 10 MG tablet    Sig: TAKE 2 TABLETS (20 MG) BY MOUTH DAILY.    Disp: 180 tablet    Refills: 1    Start: 6/23/2025    Class: E-Prescribe    Non-formulary For: Essential hypertension    Last ordered: 9 months ago (9/17/2024) by Roby Weathers MD    Last refill: 1/30/2025    Rx #: [id number]    ACE Inhibitors (Including Combos) Protocol [id number]/23/2025 05:01 PM   Protocol Details Most recent blood pressure under 140/90 in past 12 months- Clinicial or Patient Reported    Medication is active on med list and the sig matches. RN to manually verify dose and sig if red X/fail.    Medication indicated for associated diagnosis    Recent (12 month) or future (90 days) visit with authorizing provider's specialty (provided they have been seen in the past 15 months)    Most recent GFR on file in the past 12 months >30    Patient is age 18 or older    Normal serum potassium on file in past 12 months        BP Readings from Last 3 Encounters:   10/23/24 138/87   09/17/24 (!) 143/96   08/06/24 (!) 147/104     GFR Estimate   Date Value Ref Range Status   09/17/2024 >90 >60 mL/min/1.73m2 Final     Comment:     eGFR calculated using 2021 CKD-EPI equation.   12/05/2019 >90 >60.0 mL/min/1.7 m2 Final     Potassium   Date Value Ref Range Status   09/17/2024 4.1 3.4 - 5.3 mmol/L Final   12/05/2019 4.0 3.2 - 4.6 mmol/dL Final     Prescription approved per [facility] Refill Protocol.  PHYLLIS Carrillo, BSN